# Patient Record
Sex: FEMALE | Race: WHITE | URBAN - METROPOLITAN AREA
[De-identification: names, ages, dates, MRNs, and addresses within clinical notes are randomized per-mention and may not be internally consistent; named-entity substitution may affect disease eponyms.]

---

## 2017-01-01 ENCOUNTER — HOSPITAL ENCOUNTER (OUTPATIENT)
Dept: GENERAL RADIOLOGY | Facility: CLINIC | Age: 82
Discharge: HOME | End: 2017-08-24
Attending: RADIOLOGY | Admitting: RADIOLOGY
Payer: MEDICARE

## 2017-01-01 ENCOUNTER — OFFICE VISIT (OUTPATIENT)
Dept: SURGERY | Facility: CLINIC | Age: 82
End: 2017-01-01
Payer: MEDICARE

## 2017-01-01 ENCOUNTER — TELEPHONE (OUTPATIENT)
Dept: EMERGENCY MEDICINE | Facility: CLINIC | Age: 82
End: 2017-01-01

## 2017-01-01 ENCOUNTER — TELEPHONE (OUTPATIENT)
Dept: SURGERY | Facility: CLINIC | Age: 82
End: 2017-01-01

## 2017-01-01 ENCOUNTER — HOSPITAL ENCOUNTER (OUTPATIENT)
Dept: CT IMAGING | Facility: CLINIC | Age: 82
End: 2017-09-15
Attending: RADIOLOGY
Payer: MEDICARE

## 2017-01-01 ENCOUNTER — APPOINTMENT (OUTPATIENT)
Dept: CT IMAGING | Facility: CLINIC | Age: 82
DRG: 603 | End: 2017-01-01
Attending: EMERGENCY MEDICINE
Payer: MEDICARE

## 2017-01-01 ENCOUNTER — TELEPHONE (OUTPATIENT)
Dept: GENERAL RADIOLOGY | Facility: CLINIC | Age: 82
End: 2017-01-01

## 2017-01-01 ENCOUNTER — TELEPHONE (OUTPATIENT)
Dept: ULTRASOUND IMAGING | Facility: CLINIC | Age: 82
End: 2017-01-01

## 2017-01-01 ENCOUNTER — TRANSFERRED RECORDS (OUTPATIENT)
Dept: HEALTH INFORMATION MANAGEMENT | Facility: CLINIC | Age: 82
End: 2017-01-01

## 2017-01-01 ENCOUNTER — HOSPITAL ENCOUNTER (OUTPATIENT)
Dept: GENERAL RADIOLOGY | Facility: CLINIC | Age: 82
Discharge: HOME | End: 2017-06-01
Attending: RADIOLOGY | Admitting: RADIOLOGY
Payer: MEDICARE

## 2017-01-01 ENCOUNTER — HOSPITAL ENCOUNTER (OUTPATIENT)
Dept: GENERAL RADIOLOGY | Facility: CLINIC | Age: 82
Discharge: HOME | End: 2017-09-15
Attending: RADIOLOGY | Admitting: RADIOLOGY
Payer: MEDICARE

## 2017-01-01 ENCOUNTER — HOSPITAL ENCOUNTER (OUTPATIENT)
Dept: GENERAL RADIOLOGY | Facility: CLINIC | Age: 82
Discharge: HOME | End: 2017-03-20
Attending: RADIOLOGY | Admitting: RADIOLOGY
Payer: MEDICARE

## 2017-01-01 ENCOUNTER — HOSPITAL ENCOUNTER (INPATIENT)
Facility: CLINIC | Age: 82
LOS: 1 days | Discharge: HOME | DRG: 603 | End: 2017-11-18
Attending: EMERGENCY MEDICINE | Admitting: INTERNAL MEDICINE
Payer: MEDICARE

## 2017-01-01 ENCOUNTER — HOSPITAL ENCOUNTER (OUTPATIENT)
Dept: GENERAL RADIOLOGY | Facility: CLINIC | Age: 82
Discharge: HOME | End: 2017-04-12
Attending: RADIOLOGY | Admitting: RADIOLOGY
Payer: MEDICARE

## 2017-01-01 ENCOUNTER — HOSPITAL ENCOUNTER (EMERGENCY)
Facility: CLINIC | Age: 82
Discharge: HOME | End: 2017-08-13
Attending: EMERGENCY MEDICINE | Admitting: EMERGENCY MEDICINE
Payer: MEDICARE

## 2017-01-01 ENCOUNTER — HOSPITAL ENCOUNTER (OUTPATIENT)
Dept: GENERAL RADIOLOGY | Facility: CLINIC | Age: 82
Discharge: HOME | End: 2017-06-30
Attending: RADIOLOGY | Admitting: RADIOLOGY
Payer: MEDICARE

## 2017-01-01 VITALS
OXYGEN SATURATION: 94 % | HEART RATE: 83 BPM | TEMPERATURE: 97.9 F | SYSTOLIC BLOOD PRESSURE: 172 MMHG | RESPIRATION RATE: 18 BRPM | DIASTOLIC BLOOD PRESSURE: 82 MMHG

## 2017-01-01 VITALS
DIASTOLIC BLOOD PRESSURE: 67 MMHG | TEMPERATURE: 97.8 F | SYSTOLIC BLOOD PRESSURE: 158 MMHG | HEIGHT: 57 IN | HEART RATE: 70 BPM | BODY MASS INDEX: 31.59 KG/M2 | WEIGHT: 146.4 LBS | OXYGEN SATURATION: 94 % | RESPIRATION RATE: 24 BRPM

## 2017-01-01 DIAGNOSIS — Z48.00 DRESSING CHANGE: ICD-10-CM

## 2017-01-01 DIAGNOSIS — K80.20 GALLSTONES: ICD-10-CM

## 2017-01-01 DIAGNOSIS — L08.9 SOFT TISSUE INFECTION: ICD-10-CM

## 2017-01-01 DIAGNOSIS — N39.0 URINARY TRACT INFECTION WITHOUT HEMATURIA, SITE UNSPECIFIED: ICD-10-CM

## 2017-01-01 DIAGNOSIS — K81.0 ACUTE CHOLECYSTITIS: Primary | ICD-10-CM

## 2017-01-01 DIAGNOSIS — K82.9 GALL BLADDER DISEASE: ICD-10-CM

## 2017-01-01 DIAGNOSIS — L03.311 CELLULITIS OF ABDOMINAL WALL: ICD-10-CM

## 2017-01-01 LAB
ALBUMIN SERPL-MCNC: 2.9 G/DL (ref 3.4–5)
ALP SERPL-CCNC: 127 U/L (ref 40–150)
ALT SERPL W P-5'-P-CCNC: 21 U/L (ref 0–50)
AMIKACIN SUSC ISLT: <=2 UG/ML
AMPICILLIN SUSC ISLT: 8 UG/ML
AMPICILLIN+SULBAC SUSC ISLT: 4 UG/ML
ANION GAP SERPL CALCULATED.3IONS-SCNC: 5 MMOL/L (ref 3–14)
ANION GAP SERPL CALCULATED.3IONS-SCNC: 6 MMOL/L (ref 3–14)
AST SERPL W P-5'-P-CCNC: 19 U/L (ref 0–45)
B-LACTAMASE EXTENDED SUSC ISLT: NEGATIVE
BACTERIA SPEC CULT: ABNORMAL
BACTERIA SPEC CULT: ABNORMAL
BASOPHILS # BLD AUTO: 0 10E9/L (ref 0–0.2)
BASOPHILS # BLD AUTO: 0.1 10E9/L (ref 0–0.2)
BASOPHILS NFR BLD AUTO: 0.4 %
BASOPHILS NFR BLD AUTO: 0.5 %
BILIRUB SERPL-MCNC: 0.4 MG/DL (ref 0.2–1.3)
BUN SERPL-MCNC: 18 MG/DL (ref 7–30)
BUN SERPL-MCNC: 20 MG/DL (ref 7–30)
CALCIUM SERPL-MCNC: 8.3 MG/DL (ref 8.5–10.1)
CALCIUM SERPL-MCNC: 8.6 MG/DL (ref 8.5–10.1)
CEFAZOLIN SUSC ISLT: <=4 UG/ML
CEFEPIME SUSC ISLT: <=1 UG/ML
CEFTAZIDIME SUSC ISLT: <=1 UG/ML
CEFTRIAXONE SUSC ISLT: <=1 UG/ML
CHLORIDE SERPL-SCNC: 101 MMOL/L (ref 94–109)
CHLORIDE SERPL-SCNC: 102 MMOL/L (ref 94–109)
CIPROFLOXACIN SUSC ISLT: <=0.25 UG/ML
CO2 SERPL-SCNC: 30 MMOL/L (ref 20–32)
CO2 SERPL-SCNC: 31 MMOL/L (ref 20–32)
CREAT BLD-MCNC: 0.7 MG/DL (ref 0.52–1.04)
CREAT SERPL-MCNC: 0.75 MG/DL (ref 0.52–1.04)
CREAT SERPL-MCNC: 0.81 MG/DL (ref 0.52–1.04)
DIFFERENTIAL METHOD BLD: ABNORMAL
DIFFERENTIAL METHOD BLD: ABNORMAL
EOSINOPHIL # BLD AUTO: 0.2 10E9/L (ref 0–0.7)
EOSINOPHIL # BLD AUTO: 0.2 10E9/L (ref 0–0.7)
EOSINOPHIL NFR BLD AUTO: 1.4 %
EOSINOPHIL NFR BLD AUTO: 2.7 %
ERYTHROCYTE [DISTWIDTH] IN BLOOD BY AUTOMATED COUNT: 17.7 % (ref 10–15)
ERYTHROCYTE [DISTWIDTH] IN BLOOD BY AUTOMATED COUNT: 18 % (ref 10–15)
GENTAMICIN SUSC ISLT: <=1 UG/ML
GFR SERPL CREATININE-BSD FRML MDRD: 66 ML/MIN/1.7M2
GFR SERPL CREATININE-BSD FRML MDRD: 72 ML/MIN/1.7M2
GFR SERPL CREATININE-BSD FRML MDRD: 78 ML/MIN/1.7M2
GLUCOSE SERPL-MCNC: 117 MG/DL (ref 70–99)
GLUCOSE SERPL-MCNC: 119 MG/DL (ref 70–99)
HCT VFR BLD AUTO: 34 % (ref 35–47)
HCT VFR BLD AUTO: 35.3 % (ref 35–47)
HGB BLD-MCNC: 10.7 G/DL (ref 11.7–15.7)
HGB BLD-MCNC: 10.9 G/DL (ref 11.7–15.7)
IMM GRANULOCYTES # BLD: 0 10E9/L (ref 0–0.4)
IMM GRANULOCYTES # BLD: 0 10E9/L (ref 0–0.4)
IMM GRANULOCYTES NFR BLD: 0.4 %
IMM GRANULOCYTES NFR BLD: 0.4 %
LEVOFLOXACIN SUSC ISLT: <=0.12 UG/ML
LIPASE SERPL-CCNC: 43 U/L (ref 73–393)
LYMPHOCYTES # BLD AUTO: 1.7 10E9/L (ref 0.8–5.3)
LYMPHOCYTES # BLD AUTO: 2.3 10E9/L (ref 0.8–5.3)
LYMPHOCYTES NFR BLD AUTO: 21.2 %
LYMPHOCYTES NFR BLD AUTO: 22.6 %
MCH RBC QN AUTO: 25.2 PG (ref 26.5–33)
MCH RBC QN AUTO: 25.7 PG (ref 26.5–33)
MCHC RBC AUTO-ENTMCNC: 30.9 G/DL (ref 31.5–36.5)
MCHC RBC AUTO-ENTMCNC: 31.5 G/DL (ref 31.5–36.5)
MCV RBC AUTO: 82 FL (ref 78–100)
MCV RBC AUTO: 82 FL (ref 78–100)
MEROPENEM SUSC ISLT: <=0.25 UG/ML
MONOCYTES # BLD AUTO: 0.9 10E9/L (ref 0–1.3)
MONOCYTES # BLD AUTO: 1.3 10E9/L (ref 0–1.3)
MONOCYTES NFR BLD AUTO: 12 %
MONOCYTES NFR BLD AUTO: 12.3 %
NEUTROPHILS # BLD AUTO: 4.6 10E9/L (ref 1.6–8.3)
NEUTROPHILS # BLD AUTO: 7 10E9/L (ref 1.6–8.3)
NEUTROPHILS NFR BLD AUTO: 61.6 %
NEUTROPHILS NFR BLD AUTO: 64.5 %
NRBC # BLD AUTO: 0 10*3/UL
NRBC # BLD AUTO: 0 10*3/UL
NRBC BLD AUTO-RTO: 0 /100
NRBC BLD AUTO-RTO: 0 /100
PIP+TAZO SUSC ISLT: <=4 UG/ML
PLATELET # BLD AUTO: 280 10E9/L (ref 150–450)
PLATELET # BLD AUTO: 297 10E9/L (ref 150–450)
POTASSIUM SERPL-SCNC: 3.9 MMOL/L (ref 3.4–5.3)
POTASSIUM SERPL-SCNC: 4.1 MMOL/L (ref 3.4–5.3)
PROT SERPL-MCNC: 7.5 G/DL (ref 6.8–8.8)
RBC # BLD AUTO: 4.17 10E12/L (ref 3.8–5.2)
RBC # BLD AUTO: 4.32 10E12/L (ref 3.8–5.2)
SODIUM SERPL-SCNC: 137 MMOL/L (ref 133–144)
SODIUM SERPL-SCNC: 138 MMOL/L (ref 133–144)
SPECIMEN SOURCE: ABNORMAL
TMP SMX SUSC ISLT: ABNORMAL UG/ML
TOBRAMYCIN SUSC ISLT: <=1 UG/ML
WBC # BLD AUTO: 10.9 10E9/L (ref 4–11)
WBC # BLD AUTO: 7.5 10E9/L (ref 4–11)

## 2017-01-01 PROCEDURE — 40000809 ZZH STATISTIC NO DOCUMENTATION TO SUPPORT CHARGE

## 2017-01-01 PROCEDURE — 25000128 H RX IP 250 OP 636: Performed by: EMERGENCY MEDICINE

## 2017-01-01 PROCEDURE — 87070 CULTURE OTHR SPECIMN AEROBIC: CPT | Performed by: EMERGENCY MEDICINE

## 2017-01-01 PROCEDURE — 99222 1ST HOSP IP/OBS MODERATE 55: CPT | Mod: AI | Performed by: INTERNAL MEDICINE

## 2017-01-01 PROCEDURE — 25000132 ZZH RX MED GY IP 250 OP 250 PS 637: Mod: GY | Performed by: INTERNAL MEDICINE

## 2017-01-01 PROCEDURE — 74177 CT ABD & PELVIS W/CONTRAST: CPT

## 2017-01-01 PROCEDURE — 85025 COMPLETE CBC W/AUTO DIFF WBC: CPT | Performed by: INTERNAL MEDICINE

## 2017-01-01 PROCEDURE — 99207 ZZC CDG-MDM COMPONENT: MEETS LOW - DOWN CODED: CPT | Performed by: INTERNAL MEDICINE

## 2017-01-01 PROCEDURE — 25000128 H RX IP 250 OP 636: Performed by: INTERNAL MEDICINE

## 2017-01-01 PROCEDURE — 36415 COLL VENOUS BLD VENIPUNCTURE: CPT | Performed by: INTERNAL MEDICINE

## 2017-01-01 PROCEDURE — 82565 ASSAY OF CREATININE: CPT

## 2017-01-01 PROCEDURE — 99283 EMERGENCY DEPT VISIT LOW MDM: CPT

## 2017-01-01 PROCEDURE — 74150 CT ABDOMEN W/O CONTRAST: CPT

## 2017-01-01 PROCEDURE — 87077 CULTURE AEROBIC IDENTIFY: CPT | Performed by: EMERGENCY MEDICINE

## 2017-01-01 PROCEDURE — 99212 OFFICE O/P EST SF 10 MIN: CPT | Performed by: SURGERY

## 2017-01-01 PROCEDURE — 87186 SC STD MICRODIL/AGAR DIL: CPT | Performed by: EMERGENCY MEDICINE

## 2017-01-01 PROCEDURE — 99239 HOSP IP/OBS DSCHRG MGMT >30: CPT | Performed by: HOSPITALIST

## 2017-01-01 PROCEDURE — 85025 COMPLETE CBC W/AUTO DIFF WBC: CPT | Performed by: EMERGENCY MEDICINE

## 2017-01-01 PROCEDURE — 83690 ASSAY OF LIPASE: CPT | Performed by: EMERGENCY MEDICINE

## 2017-01-01 PROCEDURE — 12000000 ZZH R&B MED SURG/OB

## 2017-01-01 PROCEDURE — A9270 NON-COVERED ITEM OR SERVICE: HCPCS | Mod: GY | Performed by: INTERNAL MEDICINE

## 2017-01-01 PROCEDURE — 80053 COMPREHEN METABOLIC PANEL: CPT | Performed by: EMERGENCY MEDICINE

## 2017-01-01 PROCEDURE — 25000128 H RX IP 250 OP 636: Performed by: RADIOLOGY

## 2017-01-01 PROCEDURE — 47534 PLMT BILIARY DRAINAGE CATH: CPT

## 2017-01-01 PROCEDURE — 99285 EMERGENCY DEPT VISIT HI MDM: CPT | Mod: 25

## 2017-01-01 PROCEDURE — 25000125 ZZHC RX 250

## 2017-01-01 PROCEDURE — 96365 THER/PROPH/DIAG IV INF INIT: CPT

## 2017-01-01 PROCEDURE — 99219 ZZC INITIAL OBSERVATION CARE,LEVL II: CPT | Performed by: SURGERY

## 2017-01-01 PROCEDURE — 80048 BASIC METABOLIC PNL TOTAL CA: CPT | Performed by: INTERNAL MEDICINE

## 2017-01-01 RX ORDER — POTASSIUM CHLORIDE 1.5 G/1.58G
20-40 POWDER, FOR SOLUTION ORAL
Status: DISCONTINUED | OUTPATIENT
Start: 2017-01-01 | End: 2017-01-01 | Stop reason: HOSPADM

## 2017-01-01 RX ORDER — AMOXICILLIN 250 MG
1 CAPSULE ORAL 2 TIMES DAILY PRN
Status: DISCONTINUED | OUTPATIENT
Start: 2017-01-01 | End: 2017-01-01 | Stop reason: HOSPADM

## 2017-01-01 RX ORDER — ONDANSETRON 2 MG/ML
4 INJECTION INTRAMUSCULAR; INTRAVENOUS EVERY 6 HOURS PRN
Status: DISCONTINUED | OUTPATIENT
Start: 2017-01-01 | End: 2017-01-01 | Stop reason: HOSPADM

## 2017-01-01 RX ORDER — CHOLECALCIFEROL (VITAMIN D3) 50 MCG
1 CAPSULE ORAL 4 TIMES DAILY
Qty: 40 TABLET | Refills: 0 | Status: SHIPPED | OUTPATIENT
Start: 2017-01-01 | End: 2017-01-01

## 2017-01-01 RX ORDER — ASPIRIN 81 MG/1
81 TABLET ORAL DAILY
Status: DISCONTINUED | OUTPATIENT
Start: 2017-01-01 | End: 2017-01-01 | Stop reason: HOSPADM

## 2017-01-01 RX ORDER — MULTIPLE VITAMINS W/ MINERALS TAB 9MG-400MCG
1 TAB ORAL DAILY
Status: DISCONTINUED | OUTPATIENT
Start: 2017-01-01 | End: 2017-01-01 | Stop reason: HOSPADM

## 2017-01-01 RX ORDER — POTASSIUM CHLORIDE 29.8 MG/ML
20 INJECTION INTRAVENOUS
Status: DISCONTINUED | OUTPATIENT
Start: 2017-01-01 | End: 2017-01-01 | Stop reason: HOSPADM

## 2017-01-01 RX ORDER — ATORVASTATIN CALCIUM 40 MG/1
40 TABLET, FILM COATED ORAL EVERY EVENING
Status: DISCONTINUED | OUTPATIENT
Start: 2017-01-01 | End: 2017-01-01 | Stop reason: HOSPADM

## 2017-01-01 RX ORDER — METOPROLOL TARTRATE 25 MG/1
25 TABLET, FILM COATED ORAL 2 TIMES DAILY
Status: DISCONTINUED | OUTPATIENT
Start: 2017-01-01 | End: 2017-01-01 | Stop reason: HOSPADM

## 2017-01-01 RX ORDER — BISMUTH SUBSALICYLATE 262 MG/1
524 TABLET, CHEWABLE ORAL DAILY PRN
COMMUNITY

## 2017-01-01 RX ORDER — PANTOPRAZOLE SODIUM 20 MG/1
20 TABLET, DELAYED RELEASE ORAL EVERY EVENING
Status: DISCONTINUED | OUTPATIENT
Start: 2017-01-01 | End: 2017-01-01 | Stop reason: HOSPADM

## 2017-01-01 RX ORDER — AMOXICILLIN AND CLAVULANATE POTASSIUM 500; 125 MG/1; MG/1
1 TABLET, FILM COATED ORAL 2 TIMES DAILY
Qty: 14 TABLET | Refills: 0 | Status: SHIPPED | OUTPATIENT
Start: 2017-01-01 | End: 2017-01-01

## 2017-01-01 RX ORDER — IOPAMIDOL 755 MG/ML
500 INJECTION, SOLUTION INTRAVASCULAR ONCE
Status: COMPLETED | OUTPATIENT
Start: 2017-01-01 | End: 2017-01-01

## 2017-01-01 RX ORDER — POTASSIUM CHLORIDE 7.45 MG/ML
10 INJECTION INTRAVENOUS
Status: DISCONTINUED | OUTPATIENT
Start: 2017-01-01 | End: 2017-01-01 | Stop reason: HOSPADM

## 2017-01-01 RX ORDER — ISOSORBIDE MONONITRATE 30 MG/1
30 TABLET, EXTENDED RELEASE ORAL DAILY
Status: DISCONTINUED | OUTPATIENT
Start: 2017-01-01 | End: 2017-01-01

## 2017-01-01 RX ORDER — AMOXICILLIN 250 MG
2 CAPSULE ORAL 2 TIMES DAILY PRN
Status: DISCONTINUED | OUTPATIENT
Start: 2017-01-01 | End: 2017-01-01 | Stop reason: HOSPADM

## 2017-01-01 RX ORDER — IOPAMIDOL 612 MG/ML
50 INJECTION, SOLUTION INTRAVASCULAR ONCE
Status: COMPLETED | OUTPATIENT
Start: 2017-01-01 | End: 2017-01-01

## 2017-01-01 RX ORDER — CEPHALEXIN 500 MG/1
500 CAPSULE ORAL 3 TIMES DAILY
Qty: 21 CAPSULE | Refills: 0 | Status: SHIPPED | OUTPATIENT
Start: 2017-01-01 | End: 2017-01-01

## 2017-01-01 RX ORDER — GINSENG 100 MG
CAPSULE ORAL
Status: COMPLETED
Start: 2017-01-01 | End: 2017-01-01

## 2017-01-01 RX ORDER — POTASSIUM CL/LIDO/0.9 % NACL 10MEQ/0.1L
10 INTRAVENOUS SOLUTION, PIGGYBACK (ML) INTRAVENOUS
Status: DISCONTINUED | OUTPATIENT
Start: 2017-01-01 | End: 2017-01-01 | Stop reason: HOSPADM

## 2017-01-01 RX ORDER — ONDANSETRON 4 MG/1
4 TABLET, ORALLY DISINTEGRATING ORAL EVERY 6 HOURS PRN
Status: DISCONTINUED | OUTPATIENT
Start: 2017-01-01 | End: 2017-01-01 | Stop reason: HOSPADM

## 2017-01-01 RX ORDER — LEVOTHYROXINE SODIUM 75 UG/1
75 TABLET ORAL DAILY
Status: DISCONTINUED | OUTPATIENT
Start: 2017-01-01 | End: 2017-01-01 | Stop reason: HOSPADM

## 2017-01-01 RX ORDER — ISOSORBIDE MONONITRATE 30 MG/1
60 TABLET, EXTENDED RELEASE ORAL DAILY
Status: DISCONTINUED | OUTPATIENT
Start: 2017-01-01 | End: 2017-01-01

## 2017-01-01 RX ORDER — GINSENG 100 MG
CAPSULE ORAL
Status: DISCONTINUED
Start: 2017-01-01 | End: 2017-01-01 | Stop reason: HOSPADM

## 2017-01-01 RX ORDER — ISOSORBIDE MONONITRATE 30 MG/1
30 TABLET, EXTENDED RELEASE ORAL DAILY
COMMUNITY

## 2017-01-01 RX ORDER — ISOSORBIDE MONONITRATE 30 MG/1
30 TABLET, EXTENDED RELEASE ORAL DAILY
Status: DISCONTINUED | OUTPATIENT
Start: 2017-01-01 | End: 2017-01-01 | Stop reason: HOSPADM

## 2017-01-01 RX ORDER — POLYETHYLENE GLYCOL 3350 17 G/17G
17 POWDER, FOR SOLUTION ORAL DAILY PRN
Status: DISCONTINUED | OUTPATIENT
Start: 2017-01-01 | End: 2017-01-01 | Stop reason: HOSPADM

## 2017-01-01 RX ORDER — POTASSIUM CHLORIDE 1500 MG/1
20-40 TABLET, EXTENDED RELEASE ORAL
Status: DISCONTINUED | OUTPATIENT
Start: 2017-01-01 | End: 2017-01-01 | Stop reason: HOSPADM

## 2017-01-01 RX ORDER — GINSENG 100 MG
CAPSULE ORAL ONCE
Status: COMPLETED | OUTPATIENT
Start: 2017-01-01 | End: 2017-01-01

## 2017-01-01 RX ORDER — ACETAMINOPHEN 325 MG/1
650 TABLET ORAL EVERY 4 HOURS PRN
Status: DISCONTINUED | OUTPATIENT
Start: 2017-01-01 | End: 2017-01-01 | Stop reason: HOSPADM

## 2017-01-01 RX ORDER — NALOXONE HYDROCHLORIDE 0.4 MG/ML
.1-.4 INJECTION, SOLUTION INTRAMUSCULAR; INTRAVENOUS; SUBCUTANEOUS
Status: DISCONTINUED | OUTPATIENT
Start: 2017-01-01 | End: 2017-01-01 | Stop reason: HOSPADM

## 2017-01-01 RX ORDER — BISMUTH SUBSALICYLATE 262 MG/1
524 TABLET, CHEWABLE ORAL DAILY PRN
Status: DISCONTINUED | OUTPATIENT
Start: 2017-01-01 | End: 2017-01-01 | Stop reason: HOSPADM

## 2017-01-01 RX ADMIN — IOPAMIDOL 25 ML: 612 INJECTION, SOLUTION INTRAVENOUS at 14:39

## 2017-01-01 RX ADMIN — SODIUM CHLORIDE 58 ML: 9 INJECTION, SOLUTION INTRAVENOUS at 19:47

## 2017-01-01 RX ADMIN — PANTOPRAZOLE SODIUM 20 MG: 20 TABLET, DELAYED RELEASE ORAL at 22:55

## 2017-01-01 RX ADMIN — TAZOBACTAM SODIUM AND PIPERACILLIN SODIUM 3.38 G: 375; 3 INJECTION, SOLUTION INTRAVENOUS at 02:47

## 2017-01-01 RX ADMIN — TAZOBACTAM SODIUM AND PIPERACILLIN SODIUM 3.38 G: 375; 3 INJECTION, SOLUTION INTRAVENOUS at 21:21

## 2017-01-01 RX ADMIN — TAZOBACTAM SODIUM AND PIPERACILLIN SODIUM 3.38 G: 375; 3 INJECTION, SOLUTION INTRAVENOUS at 08:46

## 2017-01-01 RX ADMIN — MULTIPLE VITAMINS W/ MINERALS TAB 1 TABLET: TAB at 08:47

## 2017-01-01 RX ADMIN — METOPROLOL TARTRATE 25 MG: 25 TABLET ORAL at 08:47

## 2017-01-01 RX ADMIN — Medication 2.5 MG: at 23:44

## 2017-01-01 RX ADMIN — IOPAMIDOL 72 ML: 755 INJECTION, SOLUTION INTRAVENOUS at 19:47

## 2017-01-01 RX ADMIN — Medication 5 PACKAGE: at 14:17

## 2017-01-01 RX ADMIN — METOPROLOL TARTRATE 25 MG: 25 TABLET ORAL at 22:55

## 2017-01-01 RX ADMIN — BACITRACIN 5 PACKAGE: 500 OINTMENT TOPICAL at 14:17

## 2017-01-01 RX ADMIN — OYSTER SHELL CALCIUM WITH VITAMIN D 1 TABLET: 500; 200 TABLET, FILM COATED ORAL at 08:47

## 2017-01-01 RX ADMIN — OYSTER SHELL CALCIUM WITH VITAMIN D 1 TABLET: 500; 200 TABLET, FILM COATED ORAL at 22:55

## 2017-01-01 RX ADMIN — LEVOTHYROXINE SODIUM 75 MCG: 75 TABLET ORAL at 06:17

## 2017-01-01 RX ADMIN — ISOSORBIDE MONONITRATE 30 MG: 30 TABLET, EXTENDED RELEASE ORAL at 08:47

## 2017-01-01 RX ADMIN — ATORVASTATIN CALCIUM 40 MG: 40 TABLET, FILM COATED ORAL at 22:55

## 2017-01-01 RX ADMIN — ASPIRIN 81 MG: 81 TABLET, COATED ORAL at 08:47

## 2017-01-01 ASSESSMENT — ENCOUNTER SYMPTOMS
SHORTNESS OF BREATH: 0
COUGH: 0
LIGHT-HEADEDNESS: 0
BACK PAIN: 0
ANAL BLEEDING: 0
FEVER: 0
DIFFICULTY URINATING: 0
ABDOMINAL PAIN: 1
WEAKNESS: 0
VOMITING: 0
HEADACHES: 0
NUMBNESS: 0
FATIGUE: 0
WOUND: 0
FEVER: 0
DIARRHEA: 0
CHILLS: 0
NAUSEA: 0
BLOOD IN STOOL: 0
SHORTNESS OF BREATH: 0
DIZZINESS: 0
COLOR CHANGE: 1
DYSURIA: 0
CHILLS: 0
VOMITING: 0
HEMATURIA: 0
APPETITE CHANGE: 0

## 2017-01-01 ASSESSMENT — ACTIVITIES OF DAILY LIVING (ADL)
ADLS_ACUITY_SCORE: 12
ADLS_ACUITY_SCORE: 17
ADLS_ACUITY_SCORE: 13
ADLS_ACUITY_SCORE: 17

## 2017-01-24 ENCOUNTER — HOSPITAL ENCOUNTER (INPATIENT)
Facility: CLINIC | Age: 82
LOS: 3 days | Discharge: HOME HEALTH | DRG: 872 | End: 2017-01-27
Attending: EMERGENCY MEDICINE | Admitting: INTERNAL MEDICINE
Payer: MEDICARE

## 2017-01-24 ENCOUNTER — APPOINTMENT (OUTPATIENT)
Dept: GENERAL RADIOLOGY | Facility: CLINIC | Age: 82
DRG: 872 | End: 2017-01-24
Attending: EMERGENCY MEDICINE
Payer: MEDICARE

## 2017-01-24 ENCOUNTER — APPOINTMENT (OUTPATIENT)
Dept: CT IMAGING | Facility: CLINIC | Age: 82
DRG: 872 | End: 2017-01-24
Attending: EMERGENCY MEDICINE
Payer: MEDICARE

## 2017-01-24 ENCOUNTER — APPOINTMENT (OUTPATIENT)
Dept: ULTRASOUND IMAGING | Facility: CLINIC | Age: 82
DRG: 872 | End: 2017-01-24
Attending: EMERGENCY MEDICINE
Payer: MEDICARE

## 2017-01-24 DIAGNOSIS — M62.81 GENERALIZED MUSCLE WEAKNESS: ICD-10-CM

## 2017-01-24 DIAGNOSIS — R50.9 FEVER, UNSPECIFIED: ICD-10-CM

## 2017-01-24 DIAGNOSIS — K81.0 ACUTE CHOLECYSTITIS: Primary | ICD-10-CM

## 2017-01-24 LAB
ALBUMIN SERPL-MCNC: 2.5 G/DL (ref 3.4–5)
ALBUMIN UR-MCNC: 10 MG/DL
ALP SERPL-CCNC: 561 U/L (ref 40–150)
ALT SERPL W P-5'-P-CCNC: 360 U/L (ref 0–50)
ANION GAP SERPL CALCULATED.3IONS-SCNC: 11 MMOL/L (ref 3–14)
APPEARANCE UR: CLEAR
AST SERPL W P-5'-P-CCNC: 556 U/L (ref 0–45)
BASOPHILS # BLD AUTO: 0 10E9/L (ref 0–0.2)
BASOPHILS NFR BLD AUTO: 0.1 %
BILIRUB DIRECT SERPL-MCNC: 1 MG/DL (ref 0–0.2)
BILIRUB SERPL-MCNC: 2 MG/DL (ref 0.2–1.3)
BILIRUB UR QL STRIP: NEGATIVE
BUN SERPL-MCNC: 22 MG/DL (ref 7–30)
CALCIUM SERPL-MCNC: 8.9 MG/DL (ref 8.5–10.1)
CHLORIDE SERPL-SCNC: 98 MMOL/L (ref 94–109)
CO2 SERPL-SCNC: 26 MMOL/L (ref 20–32)
COLOR UR AUTO: YELLOW
CREAT SERPL-MCNC: 0.63 MG/DL (ref 0.52–1.04)
DEPRECATED S PYO AG THROAT QL EIA: NORMAL
DIFFERENTIAL METHOD BLD: ABNORMAL
EOSINOPHIL # BLD AUTO: 0 10E9/L (ref 0–0.7)
EOSINOPHIL NFR BLD AUTO: 0 %
ERYTHROCYTE [DISTWIDTH] IN BLOOD BY AUTOMATED COUNT: 17.5 % (ref 10–15)
FLUAV+FLUBV AG SPEC QL: NEGATIVE
FLUAV+FLUBV AG SPEC QL: NORMAL
GFR SERPL CREATININE-BSD FRML MDRD: 88 ML/MIN/1.7M2
GLUCOSE SERPL-MCNC: 207 MG/DL (ref 70–99)
GLUCOSE UR STRIP-MCNC: NEGATIVE MG/DL
HCT VFR BLD AUTO: 32.3 % (ref 35–47)
HGB BLD-MCNC: 10.2 G/DL (ref 11.7–15.7)
HGB UR QL STRIP: NEGATIVE
IMM GRANULOCYTES # BLD: 0.2 10E9/L (ref 0–0.4)
IMM GRANULOCYTES NFR BLD: 1 %
INR PPP: 1.16 (ref 0.86–1.14)
KETONES UR STRIP-MCNC: NEGATIVE MG/DL
LACTATE BLD-SCNC: 2.3 MMOL/L (ref 0.7–2.1)
LACTATE BLD-SCNC: 3.1 MMOL/L (ref 0.7–2.1)
LEUKOCYTE ESTERASE UR QL STRIP: NEGATIVE
LIPASE SERPL-CCNC: 34 U/L (ref 73–393)
LYMPHOCYTES # BLD AUTO: 0.5 10E9/L (ref 0.8–5.3)
LYMPHOCYTES NFR BLD AUTO: 3.7 %
MCH RBC QN AUTO: 25.1 PG (ref 26.5–33)
MCHC RBC AUTO-ENTMCNC: 31.6 G/DL (ref 31.5–36.5)
MCV RBC AUTO: 79 FL (ref 78–100)
MICRO REPORT STATUS: NORMAL
MONOCYTES # BLD AUTO: 1.3 10E9/L (ref 0–1.3)
MONOCYTES NFR BLD AUTO: 9.2 %
MUCOUS THREADS #/AREA URNS LPF: PRESENT /LPF
NEUTROPHILS # BLD AUTO: 12.5 10E9/L (ref 1.6–8.3)
NEUTROPHILS NFR BLD AUTO: 86 %
NITRATE UR QL: NEGATIVE
NRBC # BLD AUTO: 0 10*3/UL
NRBC BLD AUTO-RTO: 0 /100
PH UR STRIP: 5 PH (ref 5–7)
PLATELET # BLD AUTO: 324 10E9/L (ref 150–450)
POTASSIUM SERPL-SCNC: 4.2 MMOL/L (ref 3.4–5.3)
PROT SERPL-MCNC: 7.8 G/DL (ref 6.8–8.8)
RBC # BLD AUTO: 4.07 10E12/L (ref 3.8–5.2)
RBC #/AREA URNS AUTO: 0 /HPF (ref 0–2)
SODIUM SERPL-SCNC: 135 MMOL/L (ref 133–144)
SP GR UR STRIP: 1.01 (ref 1–1.03)
SPECIMEN SOURCE: NORMAL
SPECIMEN SOURCE: NORMAL
SQUAMOUS #/AREA URNS AUTO: <1 /HPF (ref 0–1)
TROPONIN I SERPL-MCNC: 0.03 UG/L (ref 0–0.04)
URN SPEC COLLECT METH UR: ABNORMAL
UROBILINOGEN UR STRIP-MCNC: 2 MG/DL (ref 0–2)
WBC # BLD AUTO: 14.6 10E9/L (ref 4–11)
WBC #/AREA URNS AUTO: 0 /HPF (ref 0–2)

## 2017-01-24 PROCEDURE — 36415 COLL VENOUS BLD VENIPUNCTURE: CPT

## 2017-01-24 PROCEDURE — 71020 XR CHEST 2 VW: CPT

## 2017-01-24 PROCEDURE — 93005 ELECTROCARDIOGRAM TRACING: CPT

## 2017-01-24 PROCEDURE — 25800025 ZZH RX 258: Performed by: EMERGENCY MEDICINE

## 2017-01-24 PROCEDURE — 25000128 H RX IP 250 OP 636: Performed by: EMERGENCY MEDICINE

## 2017-01-24 PROCEDURE — 25000132 ZZH RX MED GY IP 250 OP 250 PS 637: Performed by: EMERGENCY MEDICINE

## 2017-01-24 PROCEDURE — 12000000 ZZH R&B MED SURG/OB

## 2017-01-24 PROCEDURE — 81001 URINALYSIS AUTO W/SCOPE: CPT | Performed by: EMERGENCY MEDICINE

## 2017-01-24 PROCEDURE — 96361 HYDRATE IV INFUSION ADD-ON: CPT

## 2017-01-24 PROCEDURE — 87077 CULTURE AEROBIC IDENTIFY: CPT | Performed by: EMERGENCY MEDICINE

## 2017-01-24 PROCEDURE — 36415 COLL VENOUS BLD VENIPUNCTURE: CPT | Performed by: EMERGENCY MEDICINE

## 2017-01-24 PROCEDURE — 25500064 ZZH RX 255 OP 636: Performed by: EMERGENCY MEDICINE

## 2017-01-24 PROCEDURE — 70450 CT HEAD/BRAIN W/O DYE: CPT

## 2017-01-24 PROCEDURE — 76705 ECHO EXAM OF ABDOMEN: CPT

## 2017-01-24 PROCEDURE — 80076 HEPATIC FUNCTION PANEL: CPT | Performed by: EMERGENCY MEDICINE

## 2017-01-24 PROCEDURE — 83605 ASSAY OF LACTIC ACID: CPT | Performed by: EMERGENCY MEDICINE

## 2017-01-24 PROCEDURE — 87040 BLOOD CULTURE FOR BACTERIA: CPT | Performed by: EMERGENCY MEDICINE

## 2017-01-24 PROCEDURE — 87186 SC STD MICRODIL/AGAR DIL: CPT | Performed by: EMERGENCY MEDICINE

## 2017-01-24 PROCEDURE — 99285 EMERGENCY DEPT VISIT HI MDM: CPT | Mod: 25

## 2017-01-24 PROCEDURE — 85025 COMPLETE CBC W/AUTO DIFF WBC: CPT | Performed by: EMERGENCY MEDICINE

## 2017-01-24 PROCEDURE — 80048 BASIC METABOLIC PNL TOTAL CA: CPT | Performed by: EMERGENCY MEDICINE

## 2017-01-24 PROCEDURE — 74177 CT ABD & PELVIS W/CONTRAST: CPT

## 2017-01-24 PROCEDURE — 85610 PROTHROMBIN TIME: CPT | Performed by: EMERGENCY MEDICINE

## 2017-01-24 PROCEDURE — 87081 CULTURE SCREEN ONLY: CPT | Performed by: EMERGENCY MEDICINE

## 2017-01-24 PROCEDURE — 96374 THER/PROPH/DIAG INJ IV PUSH: CPT

## 2017-01-24 PROCEDURE — 87880 STREP A ASSAY W/OPTIC: CPT | Performed by: EMERGENCY MEDICINE

## 2017-01-24 PROCEDURE — 83690 ASSAY OF LIPASE: CPT | Performed by: PHYSICIAN ASSISTANT

## 2017-01-24 PROCEDURE — 87800 DETECT AGNT MULT DNA DIREC: CPT | Performed by: EMERGENCY MEDICINE

## 2017-01-24 PROCEDURE — 84484 ASSAY OF TROPONIN QUANT: CPT | Performed by: EMERGENCY MEDICINE

## 2017-01-24 PROCEDURE — 99223 1ST HOSP IP/OBS HIGH 75: CPT | Mod: AI | Performed by: INTERNAL MEDICINE

## 2017-01-24 PROCEDURE — 87804 INFLUENZA ASSAY W/OPTIC: CPT | Performed by: EMERGENCY MEDICINE

## 2017-01-24 RX ORDER — PANTOPRAZOLE SODIUM 20 MG/1
20 TABLET, DELAYED RELEASE ORAL DAILY
Status: CANCELLED | OUTPATIENT
Start: 2017-01-25

## 2017-01-24 RX ORDER — CLOPIDOGREL BISULFATE 75 MG/1
75 TABLET ORAL DAILY
COMMUNITY
End: 2017-01-01

## 2017-01-24 RX ORDER — ATORVASTATIN CALCIUM 40 MG/1
40 TABLET, FILM COATED ORAL EVERY EVENING
COMMUNITY

## 2017-01-24 RX ORDER — PANTOPRAZOLE SODIUM 20 MG/1
20 TABLET, DELAYED RELEASE ORAL DAILY
COMMUNITY

## 2017-01-24 RX ORDER — SODIUM CHLORIDE, SODIUM LACTATE, POTASSIUM CHLORIDE, CALCIUM CHLORIDE 600; 310; 30; 20 MG/100ML; MG/100ML; MG/100ML; MG/100ML
1000 INJECTION, SOLUTION INTRAVENOUS CONTINUOUS
Status: DISCONTINUED | OUTPATIENT
Start: 2017-01-24 | End: 2017-01-25

## 2017-01-24 RX ORDER — IOPAMIDOL 755 MG/ML
500 INJECTION, SOLUTION INTRAVASCULAR ONCE
Status: COMPLETED | OUTPATIENT
Start: 2017-01-24 | End: 2017-01-24

## 2017-01-24 RX ORDER — METOPROLOL TARTRATE 25 MG/1
25 TABLET, FILM COATED ORAL 2 TIMES DAILY
COMMUNITY

## 2017-01-24 RX ORDER — LEVOTHYROXINE SODIUM 75 UG/1
75 TABLET ORAL DAILY
COMMUNITY

## 2017-01-24 RX ORDER — ACETAMINOPHEN 325 MG/1
650 TABLET ORAL ONCE
Status: COMPLETED | OUTPATIENT
Start: 2017-01-24 | End: 2017-01-24

## 2017-01-24 RX ORDER — ISOSORBIDE MONONITRATE 60 MG/1
60 TABLET, EXTENDED RELEASE ORAL DAILY
COMMUNITY
End: 2017-01-01

## 2017-01-24 RX ORDER — MULTIVIT WITH MINERALS/LUTEIN
1 TABLET ORAL DAILY
COMMUNITY

## 2017-01-24 RX ADMIN — SODIUM CHLORIDE 500 ML: 9 INJECTION, SOLUTION INTRAVENOUS at 18:37

## 2017-01-24 RX ADMIN — SODIUM CHLORIDE, POTASSIUM CHLORIDE, SODIUM LACTATE AND CALCIUM CHLORIDE 1000 ML: 600; 310; 30; 20 INJECTION, SOLUTION INTRAVENOUS at 20:08

## 2017-01-24 RX ADMIN — SODIUM CHLORIDE 59 ML: 9 INJECTION, SOLUTION INTRAVENOUS at 23:46

## 2017-01-24 RX ADMIN — IOPAMIDOL 71 ML: 755 INJECTION, SOLUTION INTRAVENOUS at 23:46

## 2017-01-24 RX ADMIN — ACETAMINOPHEN 650 MG: 325 TABLET, FILM COATED ORAL at 20:08

## 2017-01-24 ASSESSMENT — ENCOUNTER SYMPTOMS
TREMORS: 1
CONFUSION: 1
NECK STIFFNESS: 0
SORE THROAT: 0
COUGH: 0
FEVER: 0
HEADACHES: 0
WEAKNESS: 1
FATIGUE: 1
NECK PAIN: 0

## 2017-01-24 NOTE — IP AVS SNAPSHOT
MRN:[id number]                      After Visit Summary   1/24/2017    Pamela Garcia    MRN: [id number]           Thank you!     Thank you for choosing [facility] for your care. Our goal is always to provide you with excellent care. Hearing back from our patients is one way we can continue to improve our services. Please take a few minutes to complete the written survey that you may receive in the mail after you visit. If you would like to speak to someone directly about your visit please contact Patient Relations at [phone]. Thank you!          Patient Information     Date Of Birth          12/18/1925        About your hospital stay     You were admitted on:  January 24, 2017 You last received care in the:  [place] [facility] [address] [facility]    You were discharged on:  January 27, 2017       Who to Call     For medical emergencies, please call 720.  For non-urgent questions about your medical care, please call your primary care provider or clinic, [phone]          Attending Provider     Provider    Gilma Julien MD Foehrenbacher, Balaji De La Torre MD       Primary Care Provider Office Phone # Fax #    Lemuel Riggs [phone]       [facility] [address]   [facility] [id number]        After Care Instructions     Activity       Your activity upon discharge: activity as tolerated            Diet       Follow this diet upon discharge: regular                  Follow-up Appointments     Follow-up and recommended labs and tests        Follow up with primary MD for routine check up after hospitalization in 5-7 days  Keep cholecystostomy tube in place for at least 6 weeks  Follow up with Dr. Mckeon of general surgery in 4-6 weeks.  Call [phone] to arrange appintment                  Additional Services     Home Care PT Referral for Hospital Discharge       PT to eval and treat    Your provider has ordered home care - physical therapy. If  you have not been contacted within 2 days of your discharge please call the department phone number listed on the top of this document.            Home care nursing referral       RN skilled nursing visit. RN to teach cholecystostomy tube management.    Your provider has ordered home care nursing services. If you have not been contacted within 2 days of your discharge please call the inpatient department phone number at [phone] .                  Further instructions from your care team       Special instructions for Pamela ANGELIA Wagnerangelica:  Dr. Mckeon ([facility]) [phone]  - No specific follow up needed at this time.     -  Cholecystostomy Drain:  Keep dressing Dry  Look at incision and drain site every day to monitor for changes.   Change dressing weekly; you can call the Radiology Nurse line to make an appointment: [phone]      Take you antibiotic as directed until dose is complete.   Take with food to prevent upset stomach.  Eat yogurt daily as a form of a probiotic while taking your antibiotic.     When to call your health care provider  Call your health care provider if you notice any of these changes:    The amount of fluid increases or decreases suddenly.    Large amount of blood or a clot in drainage.    The color, odor, or thickness of the fluid changes.    The tube falls out or the incision opens.    The skin around the drain is red, swollen, painful, or seeping pus.    You have a fever over 101.5 F (38.6 C) or chills.      Pending Results     Date and Time Order Name Status Description    1/25/2017 1541 Anaerobic bacterial culture Preliminary     1/25/2017 1510 Drainage Culture Aerobic Bacterial Preliminary             Statement of Approval     Ordered          01/27/17 1107  I have reviewed and agree with all the recommendations and orders detailed in this document.   EFFECTIVE NOW     Approved and electronically signed by:  Rocky Delgadillo MD             Admission  "Information        Provider Department Dept Phone    2017 Balaji Linn MD  [facility] [phone]      Your Vitals Were     Blood Pressure Pulse Temperature    164/72 mmHg 70 98.6  F (37  C) (Oral)    Respirations Height Weight    18 1.448 m (4' 9\") 64.864 kg (143 lb)    BMI (Body Mass Index) Pulse Oximetry       30.94 kg/m2 96%       MyChart Information     [employer] lets you send messages to your doctor, view your test results, renew your prescriptions, schedule appointments and more. To sign up, go to [email or web address] . Click on \"Log in\" on the left side of the screen, which will take you to the Welcome page. Then click on \"Sign up Now\" on the right side of the page.     You will be asked to enter the access code listed below, as well as some personal information. Please follow the directions to create your username and password.     Your access code is: [email or web address]  Expires: 2017  9:49 AM     Your access code will  in 90 days. If you need help or a new code, please call your [facility] clinic or [phone].        Care EveryWhere ID     This is your Care EveryWhere ID. This could be used by other organizations to access your [facility] medical records  [id number]           Review of your medicines      START taking        Dose / Directions    ciprofloxacin 500 MG tablet   Commonly known as:  CIPRO   Used for:  Acute cholecystitis        Dose:  500 mg   Take 1 tablet (500 mg) by mouth 2 times daily for 12 days   Quantity:  24 tablet   Refills:  0         CONTINUE these medicines which have NOT CHANGED        Dose / Directions    aspirin 81 MG tablet        Dose:  81 mg   Take 81 mg by mouth daily   Refills:  0       atorvastatin 40 MG tablet   Commonly known as:  LIPITOR        Dose:  40 mg   Take 40 mg by mouth daily   Refills:  0       calcium-vitamin D 600-400 MG-UNIT per tablet   Commonly known as:  CALTRATE        Dose:  1 tablet   Take 1 tablet by mouth 2 times " daily   Refills:  0       CENTRUM SILVER per tablet        Dose:  1 tablet   Take 1 tablet by mouth daily   Refills:  0       clopidogrel 75 MG tablet   Commonly known as:  PLAVIX        Dose:  75 mg   Take 75 mg by mouth daily   Refills:  0       isosorbide mononitrate 60 MG 24 hr tablet   Commonly known as:  IMDUR        Dose:  60 mg   Take 60 mg by mouth daily   Refills:  0       levothyroxine 75 MCG tablet   Commonly known as:  SYNTHROID/LEVOTHROID        Dose:  75 mcg   Take 75 mcg by mouth daily   Refills:  0       metoprolol 25 MG tablet   Commonly known as:  LOPRESSOR        Dose:  25 mg   Take 25 mg by mouth 2 times daily   Refills:  0       NITROTAB SL        Dose:  0.4 mg   Place 0.4 mg under the tongue   Refills:  0       pantoprazole 20 MG EC tablet   Commonly known as:  PROTONIX        Dose:  20 mg   Take 20 mg by mouth daily   Refills:  0            Where to get your medicines      These medications were sent to [place] [address]     Phone:  [phone]    - ciprofloxacin 500 MG tablet             Protect others around you: Learn how to safely use, store and throw away your medicines at www.disposemymeds.org.             Medication List: This is a list of all your medications and when to take them. Check marks below indicate your daily home schedule. Keep this list as a reference.      Medications           Morning Afternoon Evening Bedtime As Needed    aspirin 81 MG tablet   Take 81 mg by mouth daily                                   atorvastatin 40 MG tablet   Commonly known as:  LIPITOR   Take 40 mg by mouth daily   Last time this was given:  40 mg on 1/27/2017  8:10 AM                                   calcium-vitamin D 600-400 MG-UNIT per tablet   Commonly known as:  CALTRATE   Take 1 tablet by mouth 2 times daily                                      CENTRUM SILVER per tablet   Take 1 tablet by  mouth daily                                   ciprofloxacin 500 MG tablet   Commonly known as:  CIPRO   Take 1 tablet (500 mg) by mouth 2 times daily for 12 days            Take with food to prevent upset stomach.              Start this evening                 clopidogrel 75 MG tablet   Commonly known as:  PLAVIX   Take 75 mg by mouth daily   Last time this was given:  75 mg on 1/27/2017  8:10 AM                                   isosorbide mononitrate 60 MG 24 hr tablet   Commonly known as:  IMDUR   Take 60 mg by mouth daily   Last time this was given:  60 mg on 1/27/2017  8:10 AM                                   levothyroxine 75 MCG tablet   Commonly known as:  SYNTHROID/LEVOTHROID   Take 75 mcg by mouth daily   Last time this was given:  75 mcg on 1/27/2017  6:45 AM                                   metoprolol 25 MG tablet   Commonly known as:  LOPRESSOR   Take 25 mg by mouth 2 times daily   Last time this was given:  25 mg on 1/27/2017  8:10 AM                                      NITROTAB SL   Place 0.4 mg under the tongue                                   pantoprazole 20 MG EC tablet   Commonly known as:  PROTONIX   Take 20 mg by mouth daily

## 2017-01-24 NOTE — IP AVS SNAPSHOT
[facility] [address] [facility]    [address]    [facility] [address]    Phone:  [phone]    Fax:  [phone]                                       After Visit Summary   1/24/2017    Pamela Garcia    MRN: [id number]           After Visit Summary Signature Page     I have received my discharge instructions, and my questions have been answered. I have discussed any challenges I see with this plan with the nurse or doctor.    ..........................................................................................................................................  Patient/Patient Representative Signature      ..........................................................................................................................................  Patient Representative Print Name and Relationship to Patient    ..................................................               ................................................  Date                                            Time    ..........................................................................................................................................  Reviewed by Signature/Title    ...................................................              ..............................................  Date                                                            Time

## 2017-01-25 ENCOUNTER — APPOINTMENT (OUTPATIENT)
Dept: OCCUPATIONAL THERAPY | Facility: CLINIC | Age: 82
DRG: 872 | End: 2017-01-25
Attending: PHYSICIAN ASSISTANT
Payer: MEDICARE

## 2017-01-25 ENCOUNTER — APPOINTMENT (OUTPATIENT)
Dept: GENERAL RADIOLOGY | Facility: CLINIC | Age: 82
DRG: 872 | End: 2017-01-25
Attending: SURGERY
Payer: MEDICARE

## 2017-01-25 PROBLEM — R50.9 FEBRILE ILLNESS, ACUTE: Status: ACTIVE | Noted: 2017-01-25

## 2017-01-25 LAB
ALBUMIN SERPL-MCNC: 2.1 G/DL (ref 3.4–5)
ALP SERPL-CCNC: 414 U/L (ref 40–150)
ALT SERPL W P-5'-P-CCNC: 287 U/L (ref 0–50)
ANION GAP SERPL CALCULATED.3IONS-SCNC: 10 MMOL/L (ref 3–14)
AST SERPL W P-5'-P-CCNC: 343 U/L (ref 0–45)
BACTERIA SPEC CULT: NORMAL
BASOPHILS # BLD AUTO: 0 10E9/L (ref 0–0.2)
BASOPHILS NFR BLD AUTO: 0.1 %
BILIRUB DIRECT SERPL-MCNC: 1.7 MG/DL (ref 0–0.2)
BILIRUB SERPL-MCNC: 2.3 MG/DL (ref 0.2–1.3)
BUN SERPL-MCNC: 17 MG/DL (ref 7–30)
CALCIUM SERPL-MCNC: 8.1 MG/DL (ref 8.5–10.1)
CHLORIDE SERPL-SCNC: 105 MMOL/L (ref 94–109)
CO2 SERPL-SCNC: 26 MMOL/L (ref 20–32)
CREAT SERPL-MCNC: 0.6 MG/DL (ref 0.52–1.04)
DIFFERENTIAL METHOD BLD: ABNORMAL
EOSINOPHIL # BLD AUTO: 0 10E9/L (ref 0–0.7)
EOSINOPHIL NFR BLD AUTO: 0 %
ERYTHROCYTE [DISTWIDTH] IN BLOOD BY AUTOMATED COUNT: 17.4 % (ref 10–15)
GFR SERPL CREATININE-BSD FRML MDRD: ABNORMAL ML/MIN/1.7M2
GLUCOSE SERPL-MCNC: 144 MG/DL (ref 70–99)
GRAM STN SPEC: ABNORMAL
HCT VFR BLD AUTO: 27.2 % (ref 35–47)
HGB BLD-MCNC: 8.5 G/DL (ref 11.7–15.7)
IMM GRANULOCYTES # BLD: 0.2 10E9/L (ref 0–0.4)
IMM GRANULOCYTES NFR BLD: 1.4 %
INTERPRETATION ECG - MUSE: NORMAL
LACTATE BLD-SCNC: 1.9 MMOL/L (ref 0.7–2.1)
LACTATE SERPL-SCNC: 1.6 MMOL/L (ref 0.4–2)
LYMPHOCYTES # BLD AUTO: 0.9 10E9/L (ref 0.8–5.3)
LYMPHOCYTES NFR BLD AUTO: 5.7 %
MCH RBC QN AUTO: 25 PG (ref 26.5–33)
MCHC RBC AUTO-ENTMCNC: 31.3 G/DL (ref 31.5–36.5)
MCV RBC AUTO: 80 FL (ref 78–100)
MICRO REPORT STATUS: ABNORMAL
MICRO REPORT STATUS: NORMAL
MONOCYTES # BLD AUTO: 1.2 10E9/L (ref 0–1.3)
MONOCYTES NFR BLD AUTO: 7.2 %
NEUTROPHILS # BLD AUTO: 13.8 10E9/L (ref 1.6–8.3)
NEUTROPHILS NFR BLD AUTO: 85.6 %
NRBC # BLD AUTO: 0 10*3/UL
NRBC BLD AUTO-RTO: 0 /100
PLATELET # BLD AUTO: 273 10E9/L (ref 150–450)
POTASSIUM SERPL-SCNC: 3.6 MMOL/L (ref 3.4–5.3)
PROT SERPL-MCNC: 6.5 G/DL (ref 6.8–8.8)
RBC # BLD AUTO: 3.4 10E12/L (ref 3.8–5.2)
SODIUM SERPL-SCNC: 141 MMOL/L (ref 133–144)
SPECIMEN SOURCE: ABNORMAL
SPECIMEN SOURCE: NORMAL
WBC # BLD AUTO: 16.1 10E9/L (ref 4–11)

## 2017-01-25 PROCEDURE — 83605 ASSAY OF LACTIC ACID: CPT | Performed by: INTERNAL MEDICINE

## 2017-01-25 PROCEDURE — 36415 COLL VENOUS BLD VENIPUNCTURE: CPT | Performed by: PHYSICIAN ASSISTANT

## 2017-01-25 PROCEDURE — 25000132 ZZH RX MED GY IP 250 OP 250 PS 637: Mod: GY | Performed by: PHYSICIAN ASSISTANT

## 2017-01-25 PROCEDURE — 80076 HEPATIC FUNCTION PANEL: CPT | Performed by: PHYSICIAN ASSISTANT

## 2017-01-25 PROCEDURE — 25000132 ZZH RX MED GY IP 250 OP 250 PS 637: Mod: GY | Performed by: INTERNAL MEDICINE

## 2017-01-25 PROCEDURE — A9270 NON-COVERED ITEM OR SERVICE: HCPCS | Mod: GY | Performed by: INTERNAL MEDICINE

## 2017-01-25 PROCEDURE — 97535 SELF CARE MNGMENT TRAINING: CPT | Mod: GO | Performed by: REHABILITATION PRACTITIONER

## 2017-01-25 PROCEDURE — 85025 COMPLETE CBC W/AUTO DIFF WBC: CPT | Performed by: PHYSICIAN ASSISTANT

## 2017-01-25 PROCEDURE — 87070 CULTURE OTHR SPECIMN AEROBIC: CPT | Performed by: RADIOLOGY

## 2017-01-25 PROCEDURE — 83605 ASSAY OF LACTIC ACID: CPT | Performed by: PHYSICIAN ASSISTANT

## 2017-01-25 PROCEDURE — 87205 SMEAR GRAM STAIN: CPT | Performed by: RADIOLOGY

## 2017-01-25 PROCEDURE — 97165 OT EVAL LOW COMPLEX 30 MIN: CPT | Mod: GO | Performed by: REHABILITATION PRACTITIONER

## 2017-01-25 PROCEDURE — A9270 NON-COVERED ITEM OR SERVICE: HCPCS | Mod: GY | Performed by: PHYSICIAN ASSISTANT

## 2017-01-25 PROCEDURE — 0F9430Z DRAINAGE OF GALLBLADDER WITH DRAINAGE DEVICE, PERCUTANEOUS APPROACH: ICD-10-PCS | Performed by: RADIOLOGY

## 2017-01-25 PROCEDURE — 25000125 ZZHC RX 250: Performed by: EMERGENCY MEDICINE

## 2017-01-25 PROCEDURE — 97166 OT EVAL MOD COMPLEX 45 MIN: CPT | Mod: GO | Performed by: REHABILITATION PRACTITIONER

## 2017-01-25 PROCEDURE — 99233 SBSQ HOSP IP/OBS HIGH 50: CPT | Performed by: INTERNAL MEDICINE

## 2017-01-25 PROCEDURE — 47533 PLMT BILIARY DRAINAGE CATH: CPT

## 2017-01-25 PROCEDURE — 99221 1ST HOSP IP/OBS SF/LOW 40: CPT | Performed by: SURGERY

## 2017-01-25 PROCEDURE — 40000133 ZZH STATISTIC OT WARD VISIT: Performed by: REHABILITATION PRACTITIONER

## 2017-01-25 PROCEDURE — 87186 SC STD MICRODIL/AGAR DIL: CPT | Performed by: RADIOLOGY

## 2017-01-25 PROCEDURE — 25000125 ZZHC RX 250: Performed by: PHYSICIAN ASSISTANT

## 2017-01-25 PROCEDURE — 87075 CULTR BACTERIA EXCEPT BLOOD: CPT | Performed by: RADIOLOGY

## 2017-01-25 PROCEDURE — 12000007 ZZH R&B INTERMEDIATE

## 2017-01-25 PROCEDURE — 80048 BASIC METABOLIC PNL TOTAL CA: CPT | Performed by: PHYSICIAN ASSISTANT

## 2017-01-25 PROCEDURE — 25000125 ZZHC RX 250: Performed by: RADIOLOGY

## 2017-01-25 PROCEDURE — 25000128 H RX IP 250 OP 636: Performed by: INTERNAL MEDICINE

## 2017-01-25 PROCEDURE — 87077 CULTURE AEROBIC IDENTIFY: CPT | Performed by: RADIOLOGY

## 2017-01-25 PROCEDURE — 36415 COLL VENOUS BLD VENIPUNCTURE: CPT | Performed by: INTERNAL MEDICINE

## 2017-01-25 PROCEDURE — 25500064 ZZH RX 255 OP 636: Performed by: INTERNAL MEDICINE

## 2017-01-25 RX ORDER — ATORVASTATIN CALCIUM 40 MG/1
40 TABLET, FILM COATED ORAL DAILY
Status: DISCONTINUED | OUTPATIENT
Start: 2017-01-25 | End: 2017-01-27 | Stop reason: HOSPADM

## 2017-01-25 RX ORDER — NALOXONE HYDROCHLORIDE 0.4 MG/ML
.1-.4 INJECTION, SOLUTION INTRAMUSCULAR; INTRAVENOUS; SUBCUTANEOUS
Status: DISCONTINUED | OUTPATIENT
Start: 2017-01-25 | End: 2017-01-27 | Stop reason: HOSPADM

## 2017-01-25 RX ORDER — ASPIRIN 81 MG/1
81 TABLET ORAL DAILY
Status: DISCONTINUED | OUTPATIENT
Start: 2017-01-25 | End: 2017-01-27 | Stop reason: HOSPADM

## 2017-01-25 RX ORDER — HYDROMORPHONE HCL/0.9% NACL/PF 0.2MG/0.2
0.2 SYRINGE (ML) INTRAVENOUS
Status: DISCONTINUED | OUTPATIENT
Start: 2017-01-25 | End: 2017-01-27 | Stop reason: HOSPADM

## 2017-01-25 RX ORDER — FENTANYL CITRATE 50 UG/ML
25-50 INJECTION, SOLUTION INTRAMUSCULAR; INTRAVENOUS EVERY 5 MIN PRN
Status: DISCONTINUED | OUTPATIENT
Start: 2017-01-25 | End: 2017-01-27 | Stop reason: HOSPADM

## 2017-01-25 RX ORDER — ONDANSETRON 4 MG/1
4 TABLET, ORALLY DISINTEGRATING ORAL EVERY 6 HOURS PRN
Status: DISCONTINUED | OUTPATIENT
Start: 2017-01-25 | End: 2017-01-27 | Stop reason: HOSPADM

## 2017-01-25 RX ORDER — METOPROLOL TARTRATE 25 MG/1
25 TABLET, FILM COATED ORAL 2 TIMES DAILY
Status: DISCONTINUED | OUTPATIENT
Start: 2017-01-25 | End: 2017-01-27 | Stop reason: HOSPADM

## 2017-01-25 RX ORDER — SODIUM CHLORIDE 9 MG/ML
INJECTION, SOLUTION INTRAVENOUS CONTINUOUS
Status: DISCONTINUED | OUTPATIENT
Start: 2017-01-25 | End: 2017-01-26

## 2017-01-25 RX ORDER — ACETAMINOPHEN 325 MG/1
650 TABLET ORAL EVERY 4 HOURS PRN
Status: DISCONTINUED | OUTPATIENT
Start: 2017-01-25 | End: 2017-01-27 | Stop reason: HOSPADM

## 2017-01-25 RX ORDER — ONDANSETRON 2 MG/ML
4 INJECTION INTRAMUSCULAR; INTRAVENOUS EVERY 6 HOURS PRN
Status: DISCONTINUED | OUTPATIENT
Start: 2017-01-25 | End: 2017-01-27 | Stop reason: HOSPADM

## 2017-01-25 RX ORDER — LEVOTHYROXINE SODIUM 75 UG/1
75 TABLET ORAL
Status: DISCONTINUED | OUTPATIENT
Start: 2017-01-25 | End: 2017-01-27 | Stop reason: HOSPADM

## 2017-01-25 RX ORDER — FENTANYL CITRATE 50 UG/ML
INJECTION, SOLUTION INTRAMUSCULAR; INTRAVENOUS
Status: DISPENSED
Start: 2017-01-25 | End: 2017-01-26

## 2017-01-25 RX ORDER — IOPAMIDOL 612 MG/ML
30 INJECTION, SOLUTION INTRAVASCULAR ONCE
Status: COMPLETED | OUTPATIENT
Start: 2017-01-25 | End: 2017-01-25

## 2017-01-25 RX ORDER — LIDOCAINE HYDROCHLORIDE 10 MG/ML
1-30 INJECTION, SOLUTION EPIDURAL; INFILTRATION; INTRACAUDAL; PERINEURAL
Status: COMPLETED | OUTPATIENT
Start: 2017-01-25 | End: 2017-01-25

## 2017-01-25 RX ORDER — PROCHLORPERAZINE 25 MG
12.5 SUPPOSITORY, RECTAL RECTAL EVERY 12 HOURS PRN
Status: DISCONTINUED | OUTPATIENT
Start: 2017-01-25 | End: 2017-01-27 | Stop reason: HOSPADM

## 2017-01-25 RX ORDER — CLOPIDOGREL BISULFATE 75 MG/1
75 TABLET ORAL DAILY
Status: DISCONTINUED | OUTPATIENT
Start: 2017-01-25 | End: 2017-01-27 | Stop reason: HOSPADM

## 2017-01-25 RX ORDER — ISOSORBIDE MONONITRATE 30 MG/1
60 TABLET, EXTENDED RELEASE ORAL DAILY
Status: DISCONTINUED | OUTPATIENT
Start: 2017-01-25 | End: 2017-01-27 | Stop reason: HOSPADM

## 2017-01-25 RX ORDER — NITROGLYCERIN 0.4 MG/1
0.4 TABLET SUBLINGUAL EVERY 5 MIN PRN
Status: DISCONTINUED | OUTPATIENT
Start: 2017-01-25 | End: 2017-01-27 | Stop reason: HOSPADM

## 2017-01-25 RX ORDER — PROCHLORPERAZINE MALEATE 5 MG
5 TABLET ORAL EVERY 6 HOURS PRN
Status: DISCONTINUED | OUTPATIENT
Start: 2017-01-25 | End: 2017-01-27 | Stop reason: HOSPADM

## 2017-01-25 RX ORDER — FLUMAZENIL 0.1 MG/ML
0.2 INJECTION, SOLUTION INTRAVENOUS
Status: DISCONTINUED | OUTPATIENT
Start: 2017-01-25 | End: 2017-01-27 | Stop reason: HOSPADM

## 2017-01-25 RX ADMIN — IOPAMIDOL 30 ML: 612 INJECTION, SOLUTION INTRAVENOUS at 15:18

## 2017-01-25 RX ADMIN — ACETAMINOPHEN 650 MG: 325 TABLET, FILM COATED ORAL at 19:53

## 2017-01-25 RX ADMIN — SODIUM CHLORIDE: 9 INJECTION, SOLUTION INTRAVENOUS at 20:52

## 2017-01-25 RX ADMIN — LIDOCAINE HYDROCHLORIDE 20 MG: 10 INJECTION, SOLUTION EPIDURAL; INFILTRATION; INTRACAUDAL; PERINEURAL at 14:43

## 2017-01-25 RX ADMIN — FENTANYL CITRATE 25 MCG: 50 INJECTION INTRAMUSCULAR; INTRAVENOUS at 14:51

## 2017-01-25 RX ADMIN — MIDAZOLAM 0.5 MG: 1 INJECTION INTRAMUSCULAR; INTRAVENOUS at 14:43

## 2017-01-25 RX ADMIN — TAZOBACTAM SODIUM AND PIPERACILLIN SODIUM 3.38 G: 375; 3 INJECTION, SOLUTION INTRAVENOUS at 18:22

## 2017-01-25 RX ADMIN — TAZOBACTAM SODIUM AND PIPERACILLIN SODIUM 3.38 G: 375; 3 INJECTION, SOLUTION INTRAVENOUS at 05:59

## 2017-01-25 RX ADMIN — FENTANYL CITRATE 25 MCG: 50 INJECTION INTRAMUSCULAR; INTRAVENOUS at 14:43

## 2017-01-25 RX ADMIN — MIDAZOLAM 0.5 MG: 1 INJECTION INTRAMUSCULAR; INTRAVENOUS at 14:50

## 2017-01-25 RX ADMIN — PANTOPRAZOLE SODIUM 40 MG: 40 INJECTION, POWDER, FOR SOLUTION INTRAVENOUS at 06:55

## 2017-01-25 RX ADMIN — ASPIRIN 81 MG: 81 TABLET, COATED ORAL at 11:02

## 2017-01-25 RX ADMIN — Medication 2.5 MG: at 01:57

## 2017-01-25 RX ADMIN — ISOSORBIDE MONONITRATE 60 MG: 30 TABLET, EXTENDED RELEASE ORAL at 10:28

## 2017-01-25 RX ADMIN — TAZOBACTAM SODIUM AND PIPERACILLIN SODIUM 3.38 G: 375; 3 INJECTION, SOLUTION INTRAVENOUS at 12:26

## 2017-01-25 RX ADMIN — Medication 2.5 MG: at 22:11

## 2017-01-25 RX ADMIN — TAZOBACTAM SODIUM AND PIPERACILLIN SODIUM 3.38 G: 375; 3 INJECTION, SOLUTION INTRAVENOUS at 00:24

## 2017-01-25 RX ADMIN — CLOPIDOGREL 75 MG: 75 TABLET, FILM COATED ORAL at 11:02

## 2017-01-25 RX ADMIN — SODIUM CHLORIDE: 9 INJECTION, SOLUTION INTRAVENOUS at 01:59

## 2017-01-25 RX ADMIN — LEVOTHYROXINE SODIUM 75 MCG: 75 TABLET ORAL at 06:43

## 2017-01-25 RX ADMIN — ATORVASTATIN CALCIUM 40 MG: 40 TABLET, FILM COATED ORAL at 10:30

## 2017-01-25 RX ADMIN — METOPROLOL TARTRATE 25 MG: 25 TABLET, FILM COATED ORAL at 20:49

## 2017-01-25 RX ADMIN — METOPROLOL TARTRATE 25 MG: 25 TABLET, FILM COATED ORAL at 10:27

## 2017-01-25 ASSESSMENT — ACTIVITIES OF DAILY LIVING (ADL): PREVIOUS_RESPONSIBILITIES: MEAL PREP;LAUNDRY;SHOPPING;MEDICATION MANAGEMENT;FINANCES;DRIVING

## 2017-01-25 NOTE — ED NOTES
Pt arrives via EMS from home where she is staying with her daughter otherwise she lives by herself. Pt was in her bed when she tried to stand up and got tangled and fell per daughters. Pt was then difficult to get back up and was experiencing generalized weakness. Pt has a H/O of a UTI in December. For EMS pt had 2 periods of confusion for them with 2 periods of complete alertness. Small left facial droop noted. Pt denies hitting head. Pt having trouble getting words out.

## 2017-01-25 NOTE — PROGRESS NOTES
CM: Aware of sws consult.  PT is off the floor at procedure at this time will follow up tomorrow.

## 2017-01-25 NOTE — PLAN OF CARE
Problem: Goal Outcome Summary  Goal: Goal Outcome Summary    PT:  Orders received.  Noted plan for placement of a cholecystostomy tube today, spoke with family who is in agreement to hold PT for today. Will reschedule for tomorrow.  Per family, pt uses a walker or hand hold assist at baseline, will issue FWW to room for use while here.

## 2017-01-25 NOTE — PROGRESS NOTES
01/25/17 0748   Quick Adds   Type of Visit Initial Occupational Therapy Evaluation   Living Environment   Lives With alone   Living Arrangements condominium   Home Accessibility tub/shower is not walk in   Number of Stairs to Enter Home 0   Number of Stairs Within Home 0   Transportation Available car  (patient reports she drives herself when at home)   Living Environment Comment Patient typically resides in condo alone in [place]. Is staying with her dsaughter for the month of January. Daughter lives in a split entry home- 1 step in from garage and patient thinks 10 steps up to upper [place]. Typically, she stays on upper level when daughter is at work. Patient reports her other daughter could come stay with her when when daughter she is staying with is at work.   Self-Care   Usual Activity Tolerance fair   Current Activity Tolerance fair   Regular Exercise no   Equipment Currently Used at Home cane, straight;grab bar;bath bench  (4ww)   Activity/Exercise/Self-Care Comment Patient reports she is I with basic ADL's and IADL's, has A with housecleaning 2x/month. Was driving PTA and managing cooking and laundry as well as errands   Functional Level Prior   Ambulation 0-->independent  (at times uses SEC in condo and 4ww in community)   Transferring 0-->independent   Toileting 0-->independent   Bathing 1-->assistive equipment   Dressing 0-->independent   Eating 0-->independent   Communication 0-->understands/communicates without difficulty   Swallowing 0-->swallows foods/liquids without difficulty   Cognition 0 - no cognition issues reported   Fall history within last six months yes   Number of times patient has fallen within last six months 1   Which of the above functional risks had a recent onset or change? ambulation;transferring;toileting;bathing;dressing   Prior Functional Level Comment Patient is very [place]   General Information   Onset of Illness/Injury or Date of Surgery - Date 01/24/17   Referring Physician  "Terri Mac PA-C   Patient/Family Goals Statement to return home   Additional Occupational Profile Info/Pertinent History of Current Problem Pamela Garcia is a 89 year old female who presents to the emergency department today via EMS for evaluation of a fall and generalized weakness. The patient is currently visiting from [place], [place] and living with her daughter. Per her daughter's, the patient has been having a hard time getting off the couch and walking. The daughter left the house at 0630 this morning and the patient was alone throughout the day, although this is a normal routine. She returned to the house around 1400 and found the patient on the floor near the bed with a presumed fall out of bed. She could not lift the patient back up, therefore EMS was called and the patient was brought to the ED for further evaluation. Per EMS, the patient had two episodes of confusion and trouble talking which is different per daughters. Upon presentation, the family notes the patient has \"strong smelling urine\" as well as shaking a little bit. They believe the patient may not have eaten lunch today either. Per MD note on 1/25- \"Key management decisions made by me: given fever, abnormal LFTs, AMS, RUQ abd pain certainly she has cholangitis but has responded very well to initial treatments and now does not appear toxic and hemodynamics/lactic acid are normalizing.  Her family favors conservative management in the short term, particularly given improvement.  We'll continue IV abx, fluids and supportive cares but certainly its reasonable to have her seen by surgery to discuss options.  Thankfully, there doesn't seem to be CBD obstruction as a proximal cause of her cholangitis/cholecystitis so I doubt ERCP would be of benefit at this time.\"   Precautions/Limitations fall precautions   General Observations patient was in bed and agreeable to OT session, hoping to dc home today   Cognitive Status Examination   Orientation " orientation to person, place and time  (unable to name city/name of hospital, but pt not from here)   Level of Consciousness alert   Able to Follow Commands WNL/WFL   Personal Safety (Cognitive) decreased awareness, need for assist  (not fully attending to IV lines with mobility- cues needed)   Memory intact   Cognitive Comment patient reports she was not aware she had fallen at daughter's home, feeling more alert and cognitively intact right now- pt. report   Visual Perception   Visual Perception Wears glasses   Pain Assessment   Patient Currently in Pain No   Posture   Posture kyphosis   Range of Motion (ROM)   ROM Comment limite in B shoulders for flexion and abduction- able ot manage functionally, suspect baseline   Strength   Strength Comments decreased strength not through fucntional activity- unsteady with mobuility, using IV pole or room furniture for support. Patient also reports shakiness with activity.    Hand Strength   Hand Strength Comments suspect baseline, intact functionally   Muscle Tone Assessment   Muscle Tone Quick Adds No deficits were identified   Coordination   Upper Extremity Coordination No deficits were identified   Mobility   Bed Mobility Comments mod I with bed mobility with HOB elevated to incline and use of bed rails for support for supine to sit transfer   Transfer Skill: Bed to Chair/Chair to Bed   Level of [place]: Bed to Chair (treatment initiated-defer to OT daily note for details)   Transfer Skill: Sit to Stand   Level of [place]: Sit/Stand contact guard   Physical Assist/Nonphysical Assist: Sit/Stand supervision;verbal cues   Transfer Skill: Sit to Stand full weight-bearing   Assistive Device for Transfer: Sit/Stand other (see comments)  (iv pole for support)   Toilet Transfer   Toilet Transfer Comments treatment initiated-defer to OT daily note for details   Balance   Balance Comments no LOB, however unsteadiness noted with mobility   Grooming   Level of [place]:  "Grooming (treatment initiated-defer to OT daily note for details)   Instrumental Activities of Daily Living (IADL)   Previous Responsibilities meal prep;laundry;shopping;medication management;finances;driving   IADL Comments patient has housekeeping services 2x/month, otherwise has been I with IADL's   Activities of Daily Living Analysis   Impairments Contributing to Impaired Activities of Daily Living balance impaired;ROM decreased;strength decreased   General Therapy Interventions   Planned Therapy Interventions ADL retraining;progressive activity/exercise;transfer training;strengthening   Clinical Impression   Criteria for Skilled Therapeutic Interventions Met yes, treatment indicated   OT Diagnosis decreased ADL's   Influenced by the following impairments balance impaired;ROM decreased;strength decreased   Assessment of Occupational Performance 3-5 Performance Deficits   Identified Performance Deficits basic ADL's- dressing, toileting, bathing ,sinkside cares, IADL's- cooking, laundry, driving   Clinical Decision Making (Complexity) Moderate complexity   Therapy Frequency daily   Predicted Duration of Therapy Intervention (days/wks) 3-4 days   Anticipated Discharge Disposition Home with Home Therapy  (24 hour supervision for safety)   Risks and Benefits of Treatment have been explained. Yes   Patient, Family & other staff in agreement with plan of care Yes   [facility] TM \"6 Clicks\"   2016, Trustees of [facility], under license to [employer].  All rights reserved.   6 Clicks Short Forms Daily Activity Inpatient Short Form   [facility]  \"6 Clicks\" Daily Activity Inpatient Short Form   1. Putting on and taking off regular lower body clothing? 3 - A Little   2. Bathing (including washing, rinsing, drying)? 3 - A Little   3. Toileting, which includes using toilet, bedpan or urinal? 3 - A Little   4. Putting on and taking off regular upper body clothing? 3 - A Little   5. Taking " care of personal grooming such as brushing teeth? 3 - A Little   6. Eating meals? 4 - None   Daily Activity Raw Score (Score out of 24.Lower scores equate to lower levels of function) 19   Total Evaluation Time   Total Evaluation Time (Minutes) 12

## 2017-01-25 NOTE — CONSULTS
General Surgery Consultation    Pamela Garcia MRN# [id number]   Age: 89 year old YOB: 1925     Date of Admission:  1/24/2017    Reason for consult:            cholecystitis       Requesting physician:            Dr. Singh                Assessment and Plan:   Assessment:   Sepsis secondary to acute calculous cholecystitis. Elevated LFTs and Tbil. without biliary dilation.      Plan:   I have recommend continued IV antibiotics and percutaneous drainage of the GB by IR.  Surgery should be avoided as the pt has excessively high surgical risk, having an MI and 5 stents placed 3 months ago.    I have discussed this case with the pt, her two daughters and her son.  They are in agreement with this plan.  I have discussed this case with Dr. Richard of interventional radiology.  He agrees to attempt placement of a cholecystostomy tube today.  I have discussed this case with Dr. Singh of [facility] as well.    The tube should  remian in place for at least six weeks.  The patient and family may elect to continue with percutaneous drainage until definitive surgical treatment is deemed an acceptable risk or indefinitely.  They understand the risk of recurrent cholecystitis if the tube is removed.                   Chief Complaint:   Weakness, MS changes.     History is obtained from the patient and the patient's 2 daughters and her son.         History of Present Illness:   This patient is a 89 year old  female with a h/o CAD s/p MI three months ago who presents with weakness and MS changes. She denies abdominal pain.  Per the patient's daughter the patient was confused yesterday, weak, and had difficulty getting out of bed.  The patient denies fever, chills, nausea, vomiting, and diarrhea.  Of note, the patient is visiting her daughter.  She resides in [place] and a [place].  She had an MI 3 months ago and thereafter had 5 stents placed.  She continues to be on dual agent antiplatelet therapy.  She  " does not have a history of jaundice or dark urine.  She  has not had pancreatitis in the past.              Past Medical History:    has a past medical history of GI bleed; H/O heart artery stent; and Arthritis.          Past Surgical History:     Past Surgical History   Procedure Laterality Date     Cardiac surgery       stents     Gyn surgery               Social History:     Social History   Substance Use Topics     Smoking status: Not on file     Smokeless tobacco: Not on file     Alcohol Use: Not on file             Family History:   Noncontributory         Allergies:   All allergies reviewed and addressed          Medications:     No current facility-administered medications on file prior to encounter.  No current outpatient prescriptions on file prior to encounter.    atorvastatin  40 mg Oral Daily     aspirin EC  81 mg Oral Daily     clopidogrel  75 mg Oral Daily     isosorbide mononitrate  60 mg Oral Daily     levothyroxine  75 mcg Oral QAM AC     metoprolol  25 mg Oral BID     piperacillin-tazobactam  3.375 g Intravenous Q6H     pantoprazole  40 mg Intravenous QAM AC            Review of Systems:   The 10 point review of systems is negative other than noted in the HPI.          Physical Exam:   /70 mmHg  Pulse 106  Temp(Src) 98.4  F (36.9  C) (Oral)  Resp 18  Ht 1.448 m (4' 9\")  Wt 63.504 kg (140 lb)  BMI 30.29 kg/m2  SpO2 94%  General - Well developed, well nourished elderly female in no apparent distress  HEENT:  Head normocephalic and atraumatic, pupils equal and round, conjunctivae clear, no scleral icterus, mucous membranes moist, external ears and nose normal.  Extremely [place].  Neck: Supple without thyromegaly or masses  Lymphatic: No cervical, or supraclavicular lymphadenopathy  Lungs: Clear to auscultation bilaterally  Heart: regular rate and rhythm, no murmurs  Abdomen:   soft, obese, non-distended with mild tenderness noted in the right upper quadrant . no masses " palpated  Extremities: Warm without edema  Neurologic: nonfocal  Psychiatric: Mood and affect appropriate  Skin: Without lesions, rashes, or juandice         Data:   WBC -   WBC   Date Value Ref Range Status   01/25/2017 16.1* 4.0 - 11.0 10e9/L Final   ], HgB - HEMOGLOBIN   Date Value Ref Range Status   01/25/2017 8.5* 11.7 - 15.7 g/dL Final   ]   Liver Function Studies -   Recent Labs   Lab Test  01/25/17   0620   PROTTOTAL  6.5*   ALBUMIN  2.1*   BILITOTAL  2.3*   ALKPHOS  414*   AST  343*   ALT  287*         Imaging:  All imaging studies reviewed by me.    Results for orders placed or performed during the hospital encounter of 01/24/17   CT Head w/o Contrast    Narrative    CT SCAN OF THE HEAD WITHOUT CONTRAST   1/24/2017 7:35 PM     HISTORY: Head injury. Patient on Plavix.    TECHNIQUE:  Axial images of the head and coronal reformations without  IV contrast material. Radiation dose for this scan was reduced using  automated exposure control, adjustment of the mA and/or kV according  to patient size, or iterative reconstruction technique.    COMPARISON: None.    FINDINGS:  There is generalized atrophy of the brain.  There is low  attenuation in the white matter of the cerebral hemispheres consistent  with sequelae of small vessel ischemic disease. There is no evidence  of intracranial hemorrhage, mass, acute infarct or anomaly.     The visualized portions of the sinuses and mastoids appear normal.  There is no evidence of trauma.      Impression    IMPRESSION:   1. No acute abnormality.  2.  Atrophy of the brain.  White matter changes consistent with  sequelae of small vessel ischemic disease.      CONTRERAS REYES MD   XR Chest 2 Views    Narrative    CHEST TWO VIEWS  1/24/2017 7:51 PM     HISTORY: Weakness.    COMPARISON: None.      Impression    IMPRESSION: Cardiomegaly. Lungs are clear. Normal pulmonary  vascularity. No pleural effusions. Previous repair of right proximal  humeral fracture with plate and  screws.    CONTRERAS REYES MD   US Abdomen Limited    Narrative    US ABDOMEN LIMITED  1/24/2017 11:13 PM      HISTORY: Right upper quadrant pain.     COMPARISON: None.    FINDINGS: The liver is normal in size and texture without focal mass.  There is no intra or extrahepatic biliary dilatation. The common  hepatic duct measures 0.6 cm. There are stones and sludge in the  gallbladder. The gallbladder wall is thickened to 0.8 cm. No  pericholecystic fluid. The pancreas head appears normal. The body and  tail are obscured by bowel gas. The right kidney measures 10.8 cm and  is normal in appearance. The proximal abdominal aorta and IVC appear  normal.       Impression    IMPRESSION:  1. Stones and sludge in the gallbladder as well as gallbladder wall  thickening. This may be acute cholecystitis.  2. No biliary dilatation.    RONAN MAGUIRE MD   CT Abdomen Pelvis w Contrast    Narrative    CT ABDOMEN PELVIS W CONTRAST  1/24/2017 11:56 PM      HISTORY: Right upper quadrant pain.    TECHNIQUE: CT abdomen and pelvis with intravenous contrast. Radiation  dose for this scan was reduced using automated exposure control,  adjustment of the mA and/or kV according to patient size, or iterative  reconstruction technique. 71 mL Isovue-370.     COMPARISON: None.    FINDINGS:    Abdomen: The lung bases are unremarkable. There are coronary artery  atherosclerotic calcifications. The heart is at the upper limits of  normal in size. The liver and spleen are normal in appearance. There  are multiple gallstones in the gallbladder. The gallbladder wall is  thickened and there is surrounding inflammation consistent with acute  cholecystitis. The pancreas is fatty replaced. There is nonspecific  thickening of the left adrenal gland. Right adrenal gland is normal.  Tiny cyst in the upper pole of the left kidney. There is no abdominal  or pelvic lymph node enlargement. There is extensive atherosclerotic  calcification of the aorta and its  branches. No aneurysm.    Pelvis: There are colonic diverticula without acute diverticulitis.  Short segment of the hepatic flexure of colon appears mildly  thick-walled though this is probably secondarily inflamed from the  adjacent gallbladder. The appendix is normal. No free intraperitoneal  gas or fluid. Multiple pelvic phleboliths. The uterus is absent. No  adnexal mass. Degenerative disease in the spine.      Impression    IMPRESSION:  1. Cholelithiasis, gallbladder wall thickening, and significant  surrounding inflammation consistent with acute cholecystitis.  2. Colonic diverticula without acute diverticulitis. No bowel  obstruction. A short segment of hepatic flexure of colon is  secondarily inflamed by the adjacent gallbladder.  3. Coronary artery atherosclerotic calcifications.    RONAN MAGUIRE MD       This note was created using voice recognition software. Undetected word substitutions or other errors may have occurred.     Time spent with the patient, reviewing the EMR, reviewing laboratory and imaging studies, more than 50% of which was counseling and coordinating care:  40 minutes.     Patricia Mckeon MD

## 2017-01-25 NOTE — H&P
PRIMARY CARE PROVIDER:  Lemuel Riggs DO, [facility].      CHIEF COMPLAINT:  Generalized weakness.      HISTORY OF PRESENT ILLNESS:  Ms. Pamela Garcia is a 89-year-old female who is quite hard of hearing, who is originally from [place], [place], visiting her daughter for the month of January, who presents to the emergency room with concerns of acute onset of generalized weakness.  The patient is fairly hard of hearing, so her daughter provides much of the history.  Unfortunately, no records are available at this time, as her care is through the [facility].  According to the family, she is a fairly active 89-year-old.  Her main history consists of MI, and most recently she underwent five-stent placement for her MI in 11/2016.  She is currently on dual antiplatelet therapy with baby aspirin and Plavix, as well as other routine medications including beta blockers and statin.  She has been doing fairly well since her stent placement with no complaints of recurrent chest pain, shortness of breath or any other concerns.  According to her daughter, she has been in her normal state of health.  She has been eating and drinking okay.  No complaints of any pain, no URI, no urinary symptoms.  Today, when one of them came home around 2:00 p.m., she was quite weak.  They could not get her out of bed, when she normally has no problems doing so.  Apparently, she also fell when trying to get out of bed; hence, EMS was called.      Upon arrival to the emergency room, she was found to be febrile with a temperature of 101.6.  Blood pressure had been stable, but dipped down to the upper 90s systolic, and improved to the 1-teens after fluids.  She was initially tachycardic in the low 100s, currently now at 90.  Other evaluation included fairly unremarkable BMP.  However, the liver enzymes were elevated with a total bilirubin of 2.0; and her alkaline phosphatase, AST, ALT were all around 500.  Initial troponin was negative at  0.025.  Her lactic acid was elevated at 3.1.  CBC showed a white count of 14.6 thousand, with a hemoglobin of 10.2, with a left shift.  Initial infectious workup includes a urinalysis that was unremarkable.  Blood cultures x2 were obtained and also unremarkable.  Rapid Strep was performed that was negative, as well as a negative influenza.      Chest x-ray performed showed mild cardiomegaly, but otherwise no pleural effusion, no infiltrate.      Due to complaints of some mild confusion, weakness, as well as the recent fall at home, CT scan of the head was performed that showed atrophy of the brain, but otherwise nothing acute.      EKG performed shows sinus tachycardia with a heart rate of 106 with premature supraventricular complexes, possible old inferior infarct, but otherwise nothing acute.      She was given a total of 1 liter of LR bolus, with some improvement in her blood pressure.  She was also started on Zosyn for presumed intraabdominal infection, namely possible cholangitis.  She is currently undergoing ultrasound of the abdomen as well as CT scan of the belly for further evaluation, and is recommended for admission to the hospital for evaluation and workup.      PAST MEDICAL HISTORY:   1.  History of myocardial infarctions, initially when she was in her mid-60s, and she underwent angioplasty.  Subsequently, she had another episode of myocardial infarction in 05/2010, in which she underwent one stent placement.  She was placed on full-dose aspirin as well as Plavix, and she ultimately ended up with a GI bleed two months later.  I believe she underwent endoscopy.  Family is not sure whether she had a colonoscopy at the same time or prior, but she did note that she had a colonoscopy that showed evidence of polyps.  Most recently she had a myocardial infarction which ultimately resulted in 5-stent placement in 11/2016.  Again, this was done at [place].  Note that her previous Plavix back in 2010 was  discontinued after the GI bleed.  She was maintained on baby aspirin.  She is now currently on dual platelet therapy again since 11/2016.  Records are not available in terms of the types of stents used, but they will be requested from [facility].   2.  History of arthritis.   3.  No history of pulmonary issues.  No previous history of gallbladder issues.   4.  History of hypothyroidism.      PAST SURGICAL HISTORY:   1.  Angioplasty when she was in her 60s.   2.  One-stent placement in 2010.   3.  Five-stent placement in 11/2016.   4.  Hysterectomy in the '60s.   5.  Surgery for pelvic prolapse.   6.  No orthopedic surgeries.      FAMILY HISTORY:  Reviewed and noncontributory to this admission.      SOCIAL HISTORY:  The patient is a .  She normally lives in [place] and is quite independent.  She does not use any walking or assistive device.  She is currently visiting one of her daughters here in the [place] for the month of January.  She is a nonsmoker, nonalcohol user.      REVIEW OF SYSTEMS:  Fairly unremarkable.  She denies any chest pain or shortness of breath, no nausea or vomiting, no complaints of diarrhea or palpitation, no change in urinary or bowel habits.  She had been asymptomatic prior to this.  She has not had any weight loss.  No new foods or travels.  Otherwise, 12-point system reviewed and all are negative beyond those stated in the HPI.      PHYSICAL EXAM:   VITAL SIGNS:  T-max of 101.2 with a heart rate of 90, blood pressure 113/50, respirations 20, saturating 95% on room air.   GENERAL APPEARANCE:  The patient is awake, alert.  She is oriented.  She is nontoxic appearing.  She is very hard of hearing.   HEENT:  Pupils are equal, round, reactive to light.  Extraocular movements are intact.  Sclerae nonicteric.  Conjunctivae pink.  Oral mucosa is pink and moist.   NECK:  Supple with no cervical lymphadenopathy or thyromegaly.  Trachea is midline.   CARDIAC:  Regular rate and rhythm, normal  S1-S2, with no significant loud murmurs appreciated.   PULMONARY:  Decreased breath sounds at the bases, otherwise clear to auscultation bilaterally.  No wheezing, rales or rhonchi.  No use of accessory muscles, no intercostal retraction.   ABDOMEN:  Bowel sounds are present.  Soft.  She does express some tenderness to palpation over the right upper quadrant.  No epigastric tenderness.  She also does endorse some lower pelvic abdominal tenderness as well, but no rebound, guarding, or peritoneal sign.   EXTREMITIES:  No clubbing, cyanosis or edema.   NEUROLOGIC:  Cranial nerves II-XII are intact.  She has bilateral symmetric upper and lower extremity strength.  She moves all of her extremities.  She has no focal deficits.   PSYCHIATRIC:  Mood and affect are appropriate.  She can answer questions appropriately, although she is quite hard of hearing.      LABORATORY RESULTS:  Again, most notable for abnormal LFTs with a total bilirubin of 2.0, alkaline phosphatase 561 with ALT of 360 and AST of 556.  Total bilirubin was slightly elevated at 2.0.  Lactic acid was initially elevated at 3.1, and on repeat is 2.3.  Lipase is low at 34.  Troponin is 0.025.  Blood glucose is 207.  On initial arrival, white count was 14.6 thousand with hemoglobin of 10.2, and platelet count of 324,000.  I am not sure what her baseline hemoglobin is, and again, records will be requested from [facility].  Again, blood cultures x2 have been obtained.  She has a negative urinalysis, negative Rapid Strep screen, and negative influenza screen.  Chest x-ray, EKG and CT of the head are all unremarkable at this time.      ASSESSMENT AND PLAN:  Ms. Pamela Garcia is a 89-year-old female with a past medical history significant for coronary artery disease, most recently status post 5-stent placement at [facility] in 11/2016.  She is currently on dual antiplatelet therapy.  She presents to the emergency room with acute onset of generalized weakness  without any preceding or constitutional symptoms.  Currently, the patient really does not have any complaints.  She is unaware that she is febrile.  She is not really complaining of any pain except upon my examination of her abdomen.  She is febrile with a T-max of 101, with elevated white count and elevated LFTs.  Working diagnosis is broad, including potential cholecystitis, potential cholangitis, versus possible intraabdominal causes for her fevers and white count.  She will be admitted to the hospital under s inpatient status for further evaluation and workup.   1.  Acute febrile illness:  Broad differential diagnosis.  So far, we have ruled out pneumonia with negative chest x-ray; urinalysis appeared to be clear; Rapid Strep and influenza all unremarkable.  With her elevated liver function tests and white count, must consider acute cholecystitis, potential cholangitis, versus other intraabdominal causes.  No obvious lesions on her skin to suggest possible cellulitis or other causes of infection.  She has not had any recent travels or food to suggest acute hepatitis, although that is still in the differential, pending CT scan of the abdomen and ultrasound results.  At this time, we will treat her empirically with IV antibiotics.  We will continue the Zosyn that was started in the emergency room.  She will be supported with IV fluids with normal saline at 125 cc an hour for the next 8 hours, and then decrease down to 100 cc an hour.  Currently, ultrasound of the abdomen as well as CT scan of the abdomen and pelvis are obtained and currently pending, so further directions as to her plan of care are pending those results.  We will also repeat liver function tests in the morning, as well as her other labs.   2.  Lactic acidosis:  Improved after IV fluids.  Obviously, with concerns for potential infection, she will be monitored closely.  Her blood pressure was a little soft earlier, but has improved with IV fluids.    3.  Leukocytosis:  This will be followed.  Antibiotics have been started for presumed possible acute cholecystitis or cholangitis versus intraabdominal process for now.   4.  Coronary artery disease:  She currently has no anginal symptoms, and cardiac-wise has been fairly stable since her stent placement.  I have kept her on baby aspirin and Plavix for now, given that her stent placement and myocardial infarction were just in 2016.  We will obtain records from [facility] to see exactly what kind of stent was placed.  Should something reveal itself that would need potential surgical evaluation, we will likely need to discuss her cardiac status with her [place] cardiologist, and/or consult Cardiology team here to help us with her cardiac risk with any potential intervention.   5.  Generalized weakness:  This is new for her.  According to the family, she is usually quite spry and active.  This is likely related to #1.  I have requested PT and OT to see her while she is here in the hospital.  I have also requested Social Work evaluation to help us with discharge needs.   6.  Hypertension:  Blood pressure currently is stable.  I have resumed her usual home medications including her Imdur and metoprolol with parameters.   7.  Hypothyroidism:  Resume Synthroid.   8.  Hyperlipidemia:  Resume statin.   9.  This patient will be admitted under inpatient status.   10.  CODE STATUS:  I discussed this with the patient as well as her family.  She requests to be DNI, but is amenable for cardiac resuscitation.         JOAN NELSON MD       As dictated by LAUREN POOLE            D: 2017 23:20   T: 2017 01:00   MT: TREVOR#[id number]      Name:     CODIE ROWE   MRN:      [id number]        Account:      [id number]   :      1925           Admitted:     [id number]      Document: [id number]       cc: Lemuel Riggs DO

## 2017-01-25 NOTE — PROGRESS NOTES
Patient seen and examined, discussed w/ LAUREN Ames.  Please see her H/P for full details.  I agree w/ her initial plan of care.  Labs/medications/imaging/vitals all reviewed.    In brief, Ms. Garcia is a pleasant 89 y.o. Woman w/ fairly recent cardiac stents placed at [facility] on [address] visiting from out of town who developed fever, AMS, weakness and abdominal pain who presented to the ER and was found to have abnormal LFTs and early sepsis.  She was appropriately treated with IV antibiotics and fluids.  I did see her following initial resuscitation and she now appears much brighter, is joking and per her family returning to her baseline.  Her abdominal exam is significant for moderate RUQ ttp.  US shows stones/sludge and GB wall thickening but normal CBD.  CT scan was subsequently obtained and preliminarily is c/w cholecystitis as well.    Key management decisions made by me: given fever, abnormal LFTs, AMS, RUQ abd pain certainly she has cholangitis but has responded very well to initial treatments and now does not appear toxic and hemodynamics/lactic acid are normalizing.  Her family favors conservative management in the short term, particularly given improvement.  We'll continue IV abx, fluids and supportive cares but certainly its reasonable to have her seen by surgery to discuss options.  Thankfully, there doesn't seem to be CBD obstruction as a proximal cause of her cholangitis/cholecystitis so I doubt ERCP would be of benefit at this time.  Reassess pending repeat LFTs/lactic acid/WBC.    Balaji Linn MD  01/25/2017  12:35 AM

## 2017-01-25 NOTE — PLAN OF CARE
Problem: Goal Outcome Summary  Goal: Goal Outcome Summary  OT- eval completed and treatment initiated. Patient typically resides in [facility] alone in [place]. Currently, she is staying with her daughter for the month of January. Daughter lives in a split entry home- 1 step in from garage and patient thinks 10 steps up to upper level. Typically, she stays on upper level when daughter is at work. Patient reports her other daughter could come stay with her when daughter she is staying with is at work. Unsure if this is accurate. At baseline, patient reports she is I with basic ADL's and IADL's, has A with housecleaning 2x/month, but sh was driving, managing cooking, laundry as well as errands.     Currently, patient is mod I with bed mobility with use of bed rails for support with HOB elevated slightly. CGA with functional mobility while using IV pole or furniture in room for support due to unsteadiness. Patient was CGA with toileting skills, including undergarments, however declined pericares despite toileting and cues. Cues also needed to wash hands after toileting, but was CGA to complete sinkside cares, including oral cares and hand washing. Ambulated in palmer for over 30 feet and transferred to chair with CGA and IV pole.     Will continue with OT as patient is not at baseline for ADL's and IADL's, recommend home with 24 hour supervision and support, recommend home therapy to increase safety with functional ADL's, mobility and increase strength and activity tolerance.

## 2017-01-25 NOTE — CONSULTS
GASTROENTEROLOGY CONSULTATION      Pamela Garcia  [address] [facility] [id number]  89 year old female     Admission Date/Time: 1/24/2017  Primary Care Provider: Lemuel Riggs     We were asked to see the patient in consultation by Dr. Delgadillo for evaluation of elevated liver enzymes, cholecystitis.    CC: weakness     HPI:  Pamela Garcia is a 89 year old female with past medical history significant for MI s/p cardiac stent x 5 11/2016 currently on ASA and Plavix presenting to the hospital yesterday with profound weakness. Patient resides independently in [place], [place] and had noted worsening fatigue/weakness over the last week. Yesterday, she was unable to get out of bed secondary to profound weakness therefore her daughter brought her to the ER. Workup has shown leukocytosis, elevated liver enzymes, hyperbilirubinemia. Abd US was positive for gall stones/sludge along with gall bladder wall thickening consistent with cholecystitis, without biliary duct dilation. CT A/P also showed gall stones and gall bladder wall thickening with reactionary colitis in the hepatic flexure. Blood cultures are positive for gram negative rods. Patient was noted to be febrile in the ER with reported temp of 101F. She has been started on IV Zosyn and general surgery has been consulted. Patient denies any history of fevers, chills, abdominal pain, nausea, vomiting, jaundice, scleral icterus, dark urine, or light colored stools.     Total bilirubin noted to be 2-->2.3, Alk phos 561-->414, -->287, -->343.  Direct bilirubin 1-->1.7. WBC 14.6-->16.1. HGB 10.2-->8.5, platelets 273. INR 1.16. Creatinine normal.        PAST MEDICAL HISTORY:  Patient Active Problem List    Diagnosis Date Noted     Febrile illness, acute 01/25/2017     Priority: Medium          ROS: A comprehensive ten point review of systems was negative aside from those in mentioned in the HPI.       MEDICATIONS:   ASA  Plavix  "  Levothyroxine  Atorvastatin  Nitroglycerin  Isosorbide  Calcium/Vitamin D  Metoprolol  Protonix     ALLERGIES: No Known Allergies     SOCIAL HISTORY:  Social History   Substance Use Topics     Smoking status: Not on file     Smokeless tobacco: Not on file     Alcohol Use: Not on file        FAMILY HISTORY:  Reviewed, noncontributory.      PHYSICAL EXAM:   /56 mmHg  Pulse 106  Temp(Src) 98.4  F (36.9  C) (Oral)  Resp 18  Ht 1.448 m (4' 9\")  Wt 63.504 kg (140 lb)  BMI 30.29 kg/m2  SpO2 94%     PHYSICAL EXAM:  General: alert, oriented, NAD  SKIN: no suspicious lesions, rashes, jaundice, or spider angiomas  HEAD: Normocephalic. No masses, lesions, tenderness or abnormalities  NECK: Neck supple. No adenopathy. Thyroid symmetric, normal size.  EYES: No scleral icterus  ENT: ENT exam normal, no neck nodes or sinus tenderness  RESPIRATORY: negative, Good diaphragmatic excursion. Lungs clear  CARDIOVASCULAR: negative, PMI normal. No lifts, heaves, or thrills. RRR. No murmurs, clicks gallops or rub  GASTROINTESTINAL: +BS, soft, +generalized tenderness, more localized tenderness noted RUQ, ND, no HSM, no masses/guarding/rebound  JOINT/EXTREMITIES: extremities normal- no gross deformities noted, gait normal and normal muscle tone  NEURO: Reflexes grossly normal and symmetric. Sensation grossly WNL.  PSYCH: no abnormal anxiety/depression  LYMPH: No anterior cervical, posterior cervical, or supraclavicular adenopathy     LABS:  I reviewed the patient's new clinical lab test results.   Recent Labs   Lab Test  01/25/17 0620 01/24/17   1813   WBC  16.1*  14.6*   HGB  8.5*  10.2*   MCV  80  79   PLT  273  324   INR   --   1.16*     Recent Labs   Lab Test  01/25/17 0620  01/24/17   1813   NA  141  135   POTASSIUM  3.6  4.2   CHLORIDE  105  98   CO2  26  26   BUN  17  22   ANIONGAP  10  11   JORDAN  8.1*  8.9     Recent Labs   Lab Test  01/25/17   0620  01/24/17   2210  01/24/17   1911  01/24/17   1813   ALBUMIN  2.1*  "  --    --   2.5*   BILITOTAL  2.3*   --    --   2.0*   ALT  287*   --    --   360*   AST  343*   --    --   556*   ALKPHOS  414*   --    --   561*   PROTEIN   --    --   10*   --    LIPASE   --   34*   --    --         IMAGING  I personally reviewed the patient's new imaging results.    US ABDOMEN LIMITED  1/24/2017 11:13 PM        HISTORY: Right upper quadrant pain.       COMPARISON: None.     FINDINGS: The liver is normal in size and texture without focal mass.  There is no intra or extrahepatic biliary dilatation. The common  hepatic duct measures 0.6 cm. There are stones and sludge in the  gallbladder. The gallbladder wall is thickened to 0.8 cm. No  pericholecystic fluid. The pancreas head appears normal. The body and  tail are obscured by bowel gas. The right kidney measures 10.8 cm and  is normal in appearance. The proximal abdominal aorta and IVC appear  normal.                                                                         IMPRESSION:  1. Stones and sludge in the gallbladder as well as gallbladder wall  thickening. This may be acute cholecystitis.  2. No biliary dilatation.       CT ABDOMEN PELVIS W IV CONTRAST  1/24/2017 11:56 PM       FINDINGS:  Abdomen: The lung bases are unremarkable. There are coronary artery  atherosclerotic calcifications. The heart is at the upper limits of  normal in size. The liver and spleen are normal in appearance. There  are multiple gallstones in the gallbladder. The gallbladder wall is  thickened and there is surrounding inflammation consistent with acute  cholecystitis. The pancreas is fatty replaced. There is nonspecific  thickening of the left adrenal gland. Right adrenal gland is normal.  Tiny cyst in the upper pole of the left kidney. There is no abdominal  or pelvic lymph node enlargement. There is extensive atherosclerotic  calcification of the aorta and its branches. No aneurysm.     Pelvis: There are colonic diverticula without acute diverticulitis.  Short  segment of the hepatic flexure of colon appears mildly  thick-walled though this is probably secondarily inflamed from the  adjacent gallbladder. The appendix is normal. No free intraperitoneal  gas or fluid. Multiple pelvic phleboliths. The uterus is absent. No  adnexal mass. Degenerative disease in the spine.                                                                    IMPRESSION:  1. Cholelithiasis, gallbladder wall thickening, and significant  surrounding inflammation consistent with acute cholecystitis.  2. Colonic diverticula without acute diverticulitis. No bowel  obstruction. A short segment of hepatic flexure of colon is  secondarily inflamed by the adjacent gallbladder.  3. Coronary artery atherosclerotic calcifications.     CONSULTATION ASSESSMENT AND PLAN:    89 year old female presenting with week long history of weakness, acutely worsening yesterday prompting evaluation in the ER. Workup consistent with acute cholecystitis on imaging and labwork with associated bacteremia. There is no evidence of bile duct dilation therefore choledocholithiasis less likely despite elevations in alk phos and bilirubin. With noted RUQ pain, leukocytosis, fever, along with gram negative rods on blood culture; also a concern for cholangitis. Complicating factors include recent MI 11/2016 with cardiac stent x5 requiring use of Plavix/ASA. Surgery has been consulted and recommend cholecystostomy tube. I feel this is likely the best approach in an elderly patient with cardiac risk factors and drainage of gall bladder very well could avoid need for ERCP. Patient would be high risk for bleeding if sphincterotomy required. At this point, IR has been consulted although are holding on procedure due to antiplatelet therapy. Keep patient NPO for now. Continue IV Zosyn.     Will discuss further with biliary team and touch base with Dr. Mckeon (). Will review patient with Dr. Figueroa, who will see patient later today.    Thank you  for asking us to participate in the care of this patient.    Eleanor Waldrop PA-C  [facility]

## 2017-01-25 NOTE — ED PROVIDER NOTES
"  History     Chief Complaint:  Fall and Generalized Weakness    History provided by the patient's daughters secondary to the patient's condition.   HPI   Pamela Garcia is a 89 year old female who presents to the emergency department today via EMS for evaluation of a fall and generalized weakness. The patient is currently visiting from [place], [place] and living with her daughter. Per her daughter's, the patient has been having a hard time getting off the couch and walking. The daughter left the house at 0630 this morning and the patient was alone throughout the day, although this is a normal routine. She returned to the house around 1400 and found the patient on the floor near the bed with a presumed fall out of bed. She could not lift the patient back up, therefore EMS was called and the patient was brought to the ED for further evaluation. Per EMS, the patient had two episodes of confusion and trouble talking which is different per daughters. Upon presentation, the family notes the patient has \"strong smelling urine\" as well as shaking a little bit. They believe the patient may not have eaten lunch today either. The patient denies pain anywhere specifically headache, chest pain, abd pain, nausea, cold like symptoms, cough, headache, numbness, visual changes, and neck or back pain. Of note, the patient normally gets her care at [facility].     Allergies:  No Known Drug Allergies     Medications:    Aspirin  Atorvastatin  Caltrate  Centrum  Plavix  Isosorbide  Levothyroxine  Metoprolol  Protonix  Nitroglycerin    Past Medical History:    Myocardial infarction    GI bleed  Arthritis     Past Surgical History:    Cardiac stent placement  Gyn surgery    Family History:    History reviewed. No pertinent family history.     Social History:  The patient was accompanied to the ED by her daughters.    Review of Systems   Constitutional: Positive for fatigue. Negative for fever.   HENT: Negative for congestion, sneezing and " "sore throat.    Eyes: Negative for visual disturbance.   Respiratory: Negative for cough.    Genitourinary:        \"strong smelling urine\"   Musculoskeletal: Negative for neck pain and neck stiffness.   Neurological: Positive for tremors and weakness. Negative for headaches.   Psychiatric/Behavioral: Positive for confusion.   All other systems reviewed and are negative.    Physical Exam   First Vitals:  BP: 162/65 mmHg  Pulse: 106  Heart Rate: 106  Temp: 98.3  F (36.8  C)  Resp: 20  Height: 144.8 cm (4' 9\")  Weight: 63.504 kg (140 lb)  SpO2: 95 %      Physical Exam  VITAL SIGNS: /65 mmHg  Pulse 106  Temp(Src) 98.3  F (36.8  C) (Oral)  Resp 20  Ht 1.448 m (4' 9\")  Wt 63.504 kg (140 lb)  BMI 30.29 kg/m2  SpO2 95%   Constitutional:  Frail elderly female, no acute distress.   HENT:  Bilateral external ears normal, Mucous membranes dry, Nose normal. Neck- Normal range of motion, Supple. Hard of hearing.   Respiratory:  Normal breath sounds, No respiratory distress, No wheezing,  Cardiovascular:  Mild tachycardic, Normal rhythm, No murmurs,    GI:  Bowel sounds normal, Soft, No tenderness,   Musculoskeletal:  Intact distal pulses, No edema, grossly unremarkable range of motion. No c-spine tenderness.   Integument:  Warm, Dry   Neurologic:  Alert, attentive and appropriately oriented. Oriented to self, year, and place. Cranial nerves II-XII intact. Somewhat halting speech. General confusion but no expressive asphasia per say. Hand , ankle planter dorsal flexions, and light touch sensations in all extremities are intact.   Psychiatric:  Mood and affect normal.     Emergency Department Course     ECG:  ECG taken at 1816, ECG read at 1838  Sinus tachycardia  Q waves in III, avF  Abnormal ECG  Rate 108 bpm. MN interval 120. QRS duration 80. QT/QTc 340/455. P-R-T axes 81 18 58.    Imaging:  Radiology findings were communicated with the patient and family who voiced understanding of the findings.  XR Chest 2 " Views  IMPRESSION: Cardiomegaly. Lungs are clear. Normal pulmonary  vascularity. No pleural effusions. Previous repair of right proximal  humeral fracture with plate and screws.  Final reading per radiology    CT Head w/o Contrast  IMPRESSION:   1. No acute abnormality.  2.  Atrophy of the brain.  White matter changes consistent with  sequelae of small vessel ischemic disease.  Report per radiology    US Abdomen Limited  Pending    CT Abdomen Pelvis w Contrast  Pending    Laboratory:  Laboratory findings were communicated with the patient and family who voiced understanding of the findings.  CBC: WBC 14.6(H), HGB 10.2(L) o/w WNL. ()   BMP: Glucose 207(H) o/w WNL (Creatinine 0.63)  Troponin (Collected 1812): 0.025  Lactic Acid: 3.1(H)  UA: Protein albumin 10, mucous present o/w WNL  Influenza: Negative  Rapid strep screen: Negative  Lactic Acid: 2.3(H)  Lipase: 34(L)    Blood Cultures: Pending  Beta Strep Group A Culture: Pending     Interventions:  1837 NS 1,000mL IV  2008 NS 1,000mL IV  2008 Tylenol 650mg PO  2008 Lactated Ringers   Zosyn 3.375g IV     Emergency Department Course:  Nursing notes and vitals reviewed.  I performed an exam of the patient as documented above.   The patient was sent for a XR Chest 2 Views, CT Head w/o Contrast, and US Abdomen Limited while in the emergency department, results above.   IV was inserted and blood was drawn for laboratory testing, results above.  The patient provided a urine sample here in the emergency department. This was sent for laboratory testing, findings above.  1819: I initially examined the patient.   2020: Patient rechecked and updated on the imaging and laboratory results. She states she feels better, and is talking more like herself per daughters.   2113:  I spoke with  Terri Mac PA-C of the hospitalist service regarding patient's presentation, findings, and plan of care.  2200: I consulted with Terri Mac PA-C regarding her hepatic panel results.  She is aware of the soft blood pressures and still agreed to admit the patient.   11:40 PM LAUREN Mac has followed results; BP has been improved. Pt stable and accepted for admission to the hospital.   I discussed the treatment plan with the patient. They expressed understanding of this plan and consented to admission. I discussed the patient with Terri Mac PA-C, who will admit the patient to a monitored bed for further evaluation and treatment.  I personally reviewed the laboratory and imaging results with the Patient and daughter and answered all related questions prior to admission.      Impression & Plan      Medical Decision Making:  Pamela Garcia is a 89 year old female brought in my family for evaluation of some generalized weakness, minimal respiratory symptoms, and a fall from bed today. She was initially afebrile here with stable vital signs, and again denied any specific symptoms. Workup here was notable for a mild leukocytosis and elevated lactic acid, and the developed of a low grade fever. Source of fever was unclear, as UA did not suggest infections. Chest x-ray was unremarkable. CT showed no sequale of trauma. On examination, he stated she was feeling better, but given the picture of some weakness with an unknown source of fever, I did recommend admission for further evaluation. She and her family had agreed with plan. The hospitalist then ordered an added on hepatic profile which was elevated and clinical course evolved from there. Given the elevation, I ordered antibiotics. Her blood pressure had a decrease prior to her 30 kg/cc bolus, down to the 90's, but was stable and then improved. She was admitted to the hospitalist for further evaluation. Does have an ultrasound and CT of the abdomen pending.     Diagnosis:    ICD-10-CM    1. Fever, unspecified R50.9    2. Generalized muscle weakness M62.81      Disposition:   Admitted under the supervision of Terri Mac PA-C    Scribe Disclosure:  I  Honey Pal, am serving as a scribe at 6:19 PM on 1/24/2017 to document services personally performed by Gilma Julien MD, based on my observations and the provider's statements to me.    1/24/2017   [facility] EMERGENCY DEPARTMENT        Gilma Julien MD  01/24/17 [id number]

## 2017-01-25 NOTE — PLAN OF CARE
Problem: Goal Outcome Summary  Goal: Goal Outcome Summary  Outcome: No Change  No fever since arriving of the floor.  Denies having any pain.  Lactic acid on recheck was 1.6.  Tele is SR.  Up with assist of 2 to the bathroom.  Plan is to continue Zosyn.  Surgery consult today.  NPO with ice chips tonight.  Continue with POC.

## 2017-01-25 NOTE — PHARMACY-ADMISSION MEDICATION HISTORY
Admission medication history interview status for this patient is complete. See [facility] admission navigator for allergy information, prior to admission medications and immunization status.     Medication history interview source(s):Family  Medication history resources (including written lists, pill bottles, clinic record):Medication List  Primary pharmacy:[facility], [address]    Changes made to PTA medication list:  Added(0): None  Deleted(0): None  Changed(1): Metoprolol tartrate(once to twice daily)    Actions taken by pharmacist (provider contacted, etc):None     Additional medication history information:  -Did not take any medications today, 1/24/17    Medication reconciliation/reorder completed by provider prior to medication history? No    For patients on insulin therapy: No (Yes/No)  Lantus/levemir/NPH/Mix 70/30 dose:  _____   in AM/PM  or twice daily   Sliding scale Novolog Y/N  If Yes, do you have a baseline novolog pre-meal dose:  ______units with meals   Patients eat three meals a day:   Y/N     Any Barriers to therapy:  cost of medications/comfortable with giving injections (if applicable)/ comfortable and confident with current diabetes regimen       Prior to Admission medications    Medication Sig Last Dose Taking? Auth Provider   calcium-vitamin D (CALTRATE) 600-400 MG-UNIT per tablet Take 1 tablet by mouth 2 times daily  Yes Reported, Patient   Multiple Vitamins-Minerals (CENTRUM SILVER) per tablet Take 1 tablet by mouth daily  Yes Reported, Patient   Nitroglycerin (NITROTAB SL) Place 0.4 mg under the tongue  Yes Reported, Patient   aspirin 81 MG tablet Take 81 mg by mouth daily 1/23/2017 at AM Yes Unknown, Entered By History   atorvastatin (LIPITOR) 40 MG tablet Take 40 mg by mouth daily 1/23/2017 at Bedtime Yes Unknown, Entered By History   clopidogrel (PLAVIX) 75 MG tablet Take 75 mg by mouth daily 1/23/2017 at AM Yes Unknown, Entered By History   isosorbide mononitrate (IMDUR) 60 MG 24 hr  tablet Take 60 mg by mouth daily 1/23/2017 at AM Yes Unknown, Entered By History   levothyroxine (SYNTHROID/LEVOTHROID) 75 MCG tablet Take 75 mcg by mouth daily 1/23/2017 at AM Yes Unknown, Entered By History   metoprolol (LOPRESSOR) 25 MG tablet Take 25 mg by mouth 2 times daily 1/23/2017 at PM Yes Unknown, Entered By History   pantoprazole (PROTONIX) 20 MG EC tablet Take 20 mg by mouth daily 1/23/2017 at AM Yes Unknown, Entered By History

## 2017-01-25 NOTE — ED NOTES
Bed: ED14  Expected date: 1/24/17  Expected time: 6:01 PM  Means of arrival: Ambulance  Comments:  RADHA

## 2017-01-25 NOTE — PROCEDURES
RADIOLOGY PROCEDURE NOTE  Patient name: Pamela Garcia  MRN: [id number]  : 1925    Pre-procedure diagnosis: Cholecystitis  Post-procedure diagnosis: Same    Procedure Date/Time: 2017  3:23 PM  Procedure: US and fluoro guided placement of 8 Fr cholecystostomy tube.  Only able to obtain 1-2 ml of bloody fluid removed.  No additional fluid to remove.  Cystic duct patent, as is CBD.  No intrahepatic biliary dilatation.  Estimated blood loss: < 5 ml  Specimen(s) collected with description: Tiny sample of fluid.  The patient tolerated the procedure well with no immediate complications.  Significant findings:  Please see above for details.    See imaging dictation for procedural details.    Provider name: Willard Romeo  Assistant(s):None

## 2017-01-25 NOTE — PROGRESS NOTES
Pt returned from radiology at 15:30. Pt has no c/o of pain, bilary drain to right abd (to gravity) drainage amount 2cc in tubing. Dressing CDI, no hematoma. VSS. Will cont to monitor

## 2017-01-25 NOTE — H&P
89 yo with hx of CAD s/p recent MI in 11/16 with 5 stent placed at [facility]. On dual anti-plt therapy, BB, statin.   Here from [facility] visiting dtr for the month of Jan. Was doing well till today with main complaint as profound weakness, can't get OOB (normally ambulatory w/o any assistive device).     ED work up shows fever of 101, elevated WBC, LA and lfts. Other infectious etiology ruled out (neg CXR, Neg UA, Neg Influenza,rapid strep).   Prelim US shows stones and sludge in the GB with GB wall thickening suspicious for acute chris. No intra and extra hepatic biliary duct, CBD 0.6 cm so less likely CBD stone obstruction.   CT a/p still pending.     A/P:  Admit as inpatient for suspected acute cholecystitis can not r/o cholangitis/ CBD stone obstruction but less likely given normal CBD size and minimal Tbili elevation. But will need to follow labs. CT a/p pending.   -Cont IV abx with Zosyn. NPO, IVF, surgical consult. Follow Hepatic, BMP and CBC in am. Lactic acid improving after IVF down to 2.3, repeat in 4 hrs (0200)  -Suspect medical management with IV abx to cool GB yobany in light of recent MI and stent placement in 11/2016 (records from [place] requested). I kept her on ASA/Plavix for now, Cont BB (with parameters) and statin for now.   -DNI only d/w pt

## 2017-01-25 NOTE — PROGRESS NOTES
[facility]  Hospitalist Progress Note  Rocky Delgadillo MD 01/25/2017    Reason for Stay (Diagnosis): sepsis         Assessment and Plan:      Summary of Stay: Pamela Garcia is a 89-year-old female with a past medical history significant for coronary artery disease, most recently status post 5-stent placement at [facility] in 11/2016.  She is currently on dual antiplatelet therapy.  She presents to the emergency room with acute onset of generalized weakness without any preceding or constitutional symptoms.  Currently, the patient really does not have any complaints.  She is unaware that she is febrile.  She is not really complaining of any pain except upon my examination of her abdomen.  She is febrile with a T-max of 101, with elevated white count and elevated LFTs.  Working diagnosis is broad, including potential cholecystitis, potential cholangitis, versus possible intraabdominal causes for her fevers and white count.  She will be admitted to the hospital under s inpatient status for further evaluation and workup.    1.  sepsis secondary to acute cholecystitis - clinically improved and not toxic appearing.  Remains on Zosyn.  Blood cx + ecoli.  Appreciate GI and surgery input - I d/w Dr. Mckeon today and she has talked to IR about placement of cholecystostomy tube given surgical risks in setting of recent MI in 11/16 and need for Plavix for drug eluting stent placement  2.  Lactic acidosis:  secondary to sepsis.  resolved    4.  Coronary artery disease:  She currently has no anginal symptoms, and cardiac-wise has been fairly stable since her stent placement.  Continue Plavix and ASA in setting of recent drug eluting stent placement to prevent in-stent thrombosis.  I talked with her dtr Angelica today who stated that CALEB were placed and she is planning on bringing in a copy of the report from the procedure documenting this  5.  Generalized weakness:  rehab consult  6.  Hypertension:  Blood  "pressure currently is stable.  resumed her usual home medications including her Imdur and metoprolol with parameters.    7.  Hypothyroidism:  Resumed Synthroid.    8.  Hyperlipidemia:  Resumed statin.      9.  Dementia  10.  CODE STATUS:  I discussed this with the patient as well as her family.  She requests to be DNI, but is amenable for cardiac resuscitation.      DVT Prophylaxis: Pneumatic Compression Devices  Code Status: DNI  Discharge Dispo: home anticipated  Estimated Disch Date / # of Days until Disch: 2-3 days or longer        Interval History (Subjective):      No complaints.  Feels. Well.  Asking when she can go home                  Physical Exam:      Last Vital Signs:  /70 mmHg  Pulse 106  Temp(Src) 98.4  F (36.9  C) (Oral)  Resp 18  Ht 1.448 m (4' 9\")  Wt 63.504 kg (140 lb)  BMI 30.29 kg/m2  SpO2 94%    No intake or output data in the 24 hours ending 01/25/17 1201    Constitutional: Awake, alert, cooperative, no apparent distress.  Sitting up in chair   Respiratory: Clear to auscultation bilaterally, no crackles or wheezing   Cardiovascular: Regular rate and rhythm, normal S1 and S2, and no murmur noted   Abdomen: Normal bowel sounds, soft, non-distended, non-tender   Skin: No rashes, no cyanosis, dry to touch   Neuro: Alert and confused c/w dementia hx, no weakness, numbness, + memory loss   Extremities: No edema, normal range of motion   Other(s):        All other systems: Negative          Medications:      All current medications were reviewed with changes reflected in problem list.         Data:      All new lab and imaging data was reviewed.   Labs:    Recent Labs  Lab 01/25/17  0620   WBC 16.1*   HGB 8.5*   HCT 27.2*   MCV 80         Imaging:   Recent Results (from the past 24 hour(s))   CT Head w/o Contrast    Narrative    CT SCAN OF THE HEAD WITHOUT CONTRAST   1/24/2017 7:35 PM     HISTORY: Head injury. Patient on Plavix.    TECHNIQUE:  Axial images of the head and coronal " reformations without  IV contrast material. Radiation dose for this scan was reduced using  automated exposure control, adjustment of the mA and/or kV according  to patient size, or iterative reconstruction technique.    COMPARISON: None.    FINDINGS:  There is generalized atrophy of the brain.  There is low  attenuation in the white matter of the cerebral hemispheres consistent  with sequelae of small vessel ischemic disease. There is no evidence  of intracranial hemorrhage, mass, acute infarct or anomaly.     The visualized portions of the sinuses and mastoids appear normal.  There is no evidence of trauma.      Impression    IMPRESSION:   1. No acute abnormality.  2.  Atrophy of the brain.  White matter changes consistent with  sequelae of small vessel ischemic disease.      CONTRERAS REYES MD   XR Chest 2 Views    Narrative    CHEST TWO VIEWS  1/24/2017 7:51 PM     HISTORY: Weakness.    COMPARISON: None.      Impression    IMPRESSION: Cardiomegaly. Lungs are clear. Normal pulmonary  vascularity. No pleural effusions. Previous repair of right proximal  humeral fracture with plate and screws.    CONTRERAS REYES MD   US Abdomen Limited    Narrative    US ABDOMEN LIMITED  1/24/2017 11:13 PM      HISTORY: Right upper quadrant pain.     COMPARISON: None.    FINDINGS: The liver is normal in size and texture without focal mass.  There is no intra or extrahepatic biliary dilatation. The common  hepatic duct measures 0.6 cm. There are stones and sludge in the  gallbladder. The gallbladder wall is thickened to 0.8 cm. No  pericholecystic fluid. The pancreas head appears normal. The body and  tail are obscured by bowel gas. The right kidney measures 10.8 cm and  is normal in appearance. The proximal abdominal aorta and IVC appear  normal.       Impression    IMPRESSION:  1. Stones and sludge in the gallbladder as well as gallbladder wall  thickening. This may be acute cholecystitis.  2. No biliary dilatation.    RONAN  MD TING   CT Abdomen Pelvis w Contrast    Narrative    CT ABDOMEN PELVIS W CONTRAST  1/24/2017 11:56 PM      HISTORY: Right upper quadrant pain.    TECHNIQUE: CT abdomen and pelvis with intravenous contrast. Radiation  dose for this scan was reduced using automated exposure control,  adjustment of the mA and/or kV according to patient size, or iterative  reconstruction technique. 71 mL Isovue-370.     COMPARISON: None.    FINDINGS:    Abdomen: The lung bases are unremarkable. There are coronary artery  atherosclerotic calcifications. The heart is at the upper limits of  normal in size. The liver and spleen are normal in appearance. There  are multiple gallstones in the gallbladder. The gallbladder wall is  thickened and there is surrounding inflammation consistent with acute  cholecystitis. The pancreas is fatty replaced. There is nonspecific  thickening of the left adrenal gland. Right adrenal gland is normal.  Tiny cyst in the upper pole of the left kidney. There is no abdominal  or pelvic lymph node enlargement. There is extensive atherosclerotic  calcification of the aorta and its branches. No aneurysm.    Pelvis: There are colonic diverticula without acute diverticulitis.  Short segment of the hepatic flexure of colon appears mildly  thick-walled though this is probably secondarily inflamed from the  adjacent gallbladder. The appendix is normal. No free intraperitoneal  gas or fluid. Multiple pelvic phleboliths. The uterus is absent. No  adnexal mass. Degenerative disease in the spine.      Impression    IMPRESSION:  1. Cholelithiasis, gallbladder wall thickening, and significant  surrounding inflammation consistent with acute cholecystitis.  2. Colonic diverticula without acute diverticulitis. No bowel  obstruction. A short segment of hepatic flexure of colon is  secondarily inflamed by the adjacent gallbladder.  3. Coronary artery atherosclerotic calcifications.    RONAN MAGUIRE MD

## 2017-01-26 ENCOUNTER — APPOINTMENT (OUTPATIENT)
Dept: PHYSICAL THERAPY | Facility: CLINIC | Age: 82
DRG: 872 | End: 2017-01-26
Payer: MEDICARE

## 2017-01-26 LAB
ALBUMIN SERPL-MCNC: 2.1 G/DL (ref 3.4–5)
ALP SERPL-CCNC: 375 U/L (ref 40–150)
ALT SERPL W P-5'-P-CCNC: 313 U/L (ref 0–50)
ANION GAP SERPL CALCULATED.3IONS-SCNC: 10 MMOL/L (ref 3–14)
AST SERPL W P-5'-P-CCNC: 370 U/L (ref 0–45)
BASOPHILS # BLD AUTO: 0 10E9/L (ref 0–0.2)
BASOPHILS NFR BLD AUTO: 0.2 %
BILIRUB DIRECT SERPL-MCNC: 1.3 MG/DL (ref 0–0.2)
BILIRUB SERPL-MCNC: 1.7 MG/DL (ref 0.2–1.3)
BUN SERPL-MCNC: 16 MG/DL (ref 7–30)
CALCIUM SERPL-MCNC: 7.6 MG/DL (ref 8.5–10.1)
CHLORIDE SERPL-SCNC: 107 MMOL/L (ref 94–109)
CO2 SERPL-SCNC: 25 MMOL/L (ref 20–32)
CREAT SERPL-MCNC: 0.68 MG/DL (ref 0.52–1.04)
DIFFERENTIAL METHOD BLD: ABNORMAL
EOSINOPHIL # BLD AUTO: 0 10E9/L (ref 0–0.7)
EOSINOPHIL NFR BLD AUTO: 0.2 %
ERYTHROCYTE [DISTWIDTH] IN BLOOD BY AUTOMATED COUNT: 17.5 % (ref 10–15)
GFR SERPL CREATININE-BSD FRML MDRD: 81 ML/MIN/1.7M2
GLUCOSE SERPL-MCNC: 126 MG/DL (ref 70–99)
HCT VFR BLD AUTO: 27.9 % (ref 35–47)
HGB BLD-MCNC: 8.8 G/DL (ref 11.7–15.7)
IMM GRANULOCYTES # BLD: 0.1 10E9/L (ref 0–0.4)
IMM GRANULOCYTES NFR BLD: 0.9 %
LYMPHOCYTES # BLD AUTO: 0.6 10E9/L (ref 0.8–5.3)
LYMPHOCYTES NFR BLD AUTO: 5.2 %
MCH RBC QN AUTO: 25.1 PG (ref 26.5–33)
MCHC RBC AUTO-ENTMCNC: 31.5 G/DL (ref 31.5–36.5)
MCV RBC AUTO: 80 FL (ref 78–100)
MONOCYTES # BLD AUTO: 0.8 10E9/L (ref 0–1.3)
MONOCYTES NFR BLD AUTO: 6.3 %
NEUTROPHILS # BLD AUTO: 10.6 10E9/L (ref 1.6–8.3)
NEUTROPHILS NFR BLD AUTO: 87.2 %
NRBC # BLD AUTO: 0 10*3/UL
NRBC BLD AUTO-RTO: 0 /100
PLATELET # BLD AUTO: 276 10E9/L (ref 150–450)
POTASSIUM SERPL-SCNC: 3.9 MMOL/L (ref 3.4–5.3)
PROT SERPL-MCNC: 6.1 G/DL (ref 6.8–8.8)
RBC # BLD AUTO: 3.5 10E12/L (ref 3.8–5.2)
SODIUM SERPL-SCNC: 142 MMOL/L (ref 133–144)
WBC # BLD AUTO: 12.1 10E9/L (ref 4–11)

## 2017-01-26 PROCEDURE — 25000128 H RX IP 250 OP 636: Performed by: INTERNAL MEDICINE

## 2017-01-26 PROCEDURE — A9270 NON-COVERED ITEM OR SERVICE: HCPCS | Mod: GY | Performed by: PHYSICIAN ASSISTANT

## 2017-01-26 PROCEDURE — A9270 NON-COVERED ITEM OR SERVICE: HCPCS | Mod: GY | Performed by: INTERNAL MEDICINE

## 2017-01-26 PROCEDURE — 25000125 ZZHC RX 250: Performed by: PHYSICIAN ASSISTANT

## 2017-01-26 PROCEDURE — 97116 GAIT TRAINING THERAPY: CPT | Mod: GP | Performed by: PHYSICAL THERAPIST

## 2017-01-26 PROCEDURE — 80053 COMPREHEN METABOLIC PANEL: CPT | Performed by: INTERNAL MEDICINE

## 2017-01-26 PROCEDURE — 25000132 ZZH RX MED GY IP 250 OP 250 PS 637: Mod: GY | Performed by: PHYSICIAN ASSISTANT

## 2017-01-26 PROCEDURE — 25000132 ZZH RX MED GY IP 250 OP 250 PS 637: Mod: GY | Performed by: INTERNAL MEDICINE

## 2017-01-26 PROCEDURE — 40000193 ZZH STATISTIC PT WARD VISIT: Performed by: PHYSICAL THERAPIST

## 2017-01-26 PROCEDURE — 97161 PT EVAL LOW COMPLEX 20 MIN: CPT | Mod: GP | Performed by: PHYSICAL THERAPIST

## 2017-01-26 PROCEDURE — 82248 BILIRUBIN DIRECT: CPT | Performed by: INTERNAL MEDICINE

## 2017-01-26 PROCEDURE — 12000007 ZZH R&B INTERMEDIATE

## 2017-01-26 PROCEDURE — 99232 SBSQ HOSP IP/OBS MODERATE 35: CPT | Performed by: INTERNAL MEDICINE

## 2017-01-26 PROCEDURE — 25000125 ZZHC RX 250: Performed by: INTERNAL MEDICINE

## 2017-01-26 PROCEDURE — 99231 SBSQ HOSP IP/OBS SF/LOW 25: CPT | Performed by: SURGERY

## 2017-01-26 PROCEDURE — 85025 COMPLETE CBC W/AUTO DIFF WBC: CPT | Performed by: INTERNAL MEDICINE

## 2017-01-26 RX ORDER — CIPROFLOXACIN 2 MG/ML
400 INJECTION, SOLUTION INTRAVENOUS EVERY 12 HOURS
Status: DISCONTINUED | OUTPATIENT
Start: 2017-01-26 | End: 2017-01-27 | Stop reason: HOSPADM

## 2017-01-26 RX ADMIN — METOPROLOL TARTRATE 25 MG: 25 TABLET, FILM COATED ORAL at 21:17

## 2017-01-26 RX ADMIN — METOPROLOL TARTRATE 25 MG: 25 TABLET, FILM COATED ORAL at 08:00

## 2017-01-26 RX ADMIN — CLOPIDOGREL 75 MG: 75 TABLET, FILM COATED ORAL at 08:00

## 2017-01-26 RX ADMIN — SODIUM CHLORIDE: 9 INJECTION, SOLUTION INTRAVENOUS at 05:44

## 2017-01-26 RX ADMIN — Medication 2.5 MG: at 21:17

## 2017-01-26 RX ADMIN — ASPIRIN 81 MG: 81 TABLET, COATED ORAL at 08:00

## 2017-01-26 RX ADMIN — TAZOBACTAM SODIUM AND PIPERACILLIN SODIUM 3.38 G: 375; 3 INJECTION, SOLUTION INTRAVENOUS at 11:20

## 2017-01-26 RX ADMIN — CIPROFLOXACIN 400 MG: 2 INJECTION, SOLUTION INTRAVENOUS at 15:09

## 2017-01-26 RX ADMIN — ATORVASTATIN CALCIUM 40 MG: 40 TABLET, FILM COATED ORAL at 08:00

## 2017-01-26 RX ADMIN — TAZOBACTAM SODIUM AND PIPERACILLIN SODIUM 3.38 G: 375; 3 INJECTION, SOLUTION INTRAVENOUS at 05:45

## 2017-01-26 RX ADMIN — LEVOTHYROXINE SODIUM 75 MCG: 75 TABLET ORAL at 07:13

## 2017-01-26 RX ADMIN — ISOSORBIDE MONONITRATE 60 MG: 30 TABLET, EXTENDED RELEASE ORAL at 08:00

## 2017-01-26 RX ADMIN — TAZOBACTAM SODIUM AND PIPERACILLIN SODIUM 3.38 G: 375; 3 INJECTION, SOLUTION INTRAVENOUS at 00:02

## 2017-01-26 RX ADMIN — PANTOPRAZOLE SODIUM 40 MG: 40 INJECTION, POWDER, FOR SOLUTION INTRAVENOUS at 08:03

## 2017-01-26 NOTE — PROGRESS NOTES
"GASTROENTEROLOGY PROGRESS NOTE        SUBJECTIVE:  Hard of hearing. No abdominal pain, nausea, vomiting. Denies fever or chills.      OBJECTIVE:    /88 mmHg  Pulse 70  Temp(Src) 98.2  F (36.8  C) (Oral)  Resp 16  Ht 1.448 m (4' 9\")  Wt 63.504 kg (140 lb)  BMI 30.29 kg/m2  SpO2 96%  Temp (24hrs), Av.9  F (37.2  C), Min:97.7  F (36.5  C), Max:101.2  F (38.4  C)    Patient Vitals for the past 72 hrs:   Weight   17 1816 63.504 kg (140 lb)       Intake/Output Summary (Last 24 hours) at 17 0907  Last data filed at 17 0240   Gross per 24 hour   Intake      0 ml   Output      5 ml   Net     -5 ml        PHYSICAL EXAM     Constitutional: Up in bedside chair.   Cardiovascular: RRR, normal S1, S2, no murmur appreciated   Respiratory: good transmission, CTAB  Abdomen: + BS, soft, NTTP  NEURO: CN 2-12 grossly intact, no focal deficits    Additional Comments:  ROS, FH, SH: See initial GI consult for details.     I have reviewed the patient's new clinical lab results:     Recent Labs   Lab Test  17   0620  17   1813   WBC  16.1*  14.6*   HGB  8.5*  10.2*   MCV  80  79   PLT  273  324   INR   --   1.16*     Recent Labs   Lab Test  17   0620  17   1813   POTASSIUM  3.6  4.2   CHLORIDE  105  98   CO2  26  26   BUN  17  22   ANIONGAP  10  11     Recent Labs   Lab Test  17   0620  17   2210  17   1911  17   1813   ALBUMIN  2.1*   --    --   2.5*   BILITOTAL  2.3*   --    --   2.0*   ALT  287*   --    --   360*   AST  343*   --    --   556*   PROTEIN   --    --   10*   --    LIPASE   --   34*   --    --         ASSESSMENT/ PLAN  Pamela Garcia is a 89-year-old female with a medical history of coronary disease status post coronary stenting ×5 in 2016 ( on Plavix and ASA) who presents with right upper quadrant pain and fever.  Patient was found to be bacteremic with source being cholecystitis. She was a poor surgical candidate so a " cholecystostomy was placed on 1/25 by IR.     1. Cholecystitis/ Sepsis:  Initial concern for cholangitis, however no bile duct dilation. Patient improving clinically. Transaminases trending down. No abdominal pain. Lily tube with brown/red drainage. Fever curve improving. Continued on Zosyn.   - No role for ERCP/ EUS at this time.   - Will no longer follow. Please call with questions for concerns.     Discussed with Dr. Figueroa.    Kathy Mcghee PA-C  [facility]

## 2017-01-26 NOTE — PLAN OF CARE
Problem: Goal Outcome Summary  Goal: Goal Outcome Summary  Outcome: Improving  VSS, no fever this shift.  Up with assist of one and a walker.  Incontinent of urine and also voiding in the bathroom.  Slept for much of the night.  Bed alarm on for safety.  Patient is up in the chair at this time.  Biliary tube is patent with minimal bloody output.  Continue with POC.

## 2017-01-26 NOTE — PLAN OF CARE
Problem: Goal Outcome Summary  Goal: Goal Outcome Summary    PT- Eval completed.  Pt is currently moving with SBA/CGA with mobility.  Pt able to perform sit to stand with SBA.  Walked 100 feet with WW and CGA.  Pt went up and down 8 steps with B rails and CGA.  Plan is for pt to discharge to her daughter's home.  Her family will provide 24 hour supervision initially.  Recommend home PT for safety and to progress mobility.

## 2017-01-26 NOTE — CONSULTS
Care Transition Initial Assessment -   Reason For Consult: discharge planning  Met with: Patient and Family    Active Problems:    Febrile illness, acute         DATA  Lives With: alone  Living Arrangements: condominium- PT is currently staying with her daughter and able to return to her home once dc from [facility]. daughter works, but has a sibling who can be with pt at home if needs are present.   Description of Support System: Supportive, Involved  Who is your support system: Children  Support Assessment: Adequate family and caregiver support, Adequate social supports. PT does well on her own.  Has a good social support in [place] and has family here in [facility]  Identified issues/concerns regarding health management: PT is being treated for sepsis due to cholecystitis    Patient feels that they have adequate support @ home?  Yes:       Quality Of Family Relationships: supportive  Transportation Available: car (patient reports she drives herself when at home)      ASSESSMENT  Cognitive Status:  awake, alert and oriented  Concerns to be addressed: AT this time no concern.s PT is very active at home, drives to daily mass and plays cards several time per week. PT is VERY [place]. Used Pocket Talker when in room.    PT is anxious to dc to home. Will have support from family to manage her drains. .       PLAN  Following but anticipate pt will dc to home with her daughter Angelica at [place]

## 2017-01-26 NOTE — PROGRESS NOTES
"[facility]  General Surgery Progress Note           Assessment and Plan:   Assessment:   PPD#1 s/p IR-placement of chris tube  Sepsis due to cholecystitis  Continued leukocytosis, occasional fevers      Plan:   -Zosyn  -Continue chris tube in place at least 6 weeks or indefinitely, as risk of cholecystitis returning if removed.  Tube instructions and dressing changes per IR.  -If future surgery being considered, Cardiology clearance needed to proceed   -Available if questions.  Will sign off at this time.         Interval History:   Denies pain, feeling feverish/chilled.  Occasional use of acetaminophen.  Hearing aides not working.         Physical Exam:   Blood pressure 165/88, pulse 70, temperature 98.2  F (36.8  C), temperature source Oral, resp. rate 16, height 1.448 m (4' 9\"), weight 63.504 kg (140 lb), SpO2 96 %.  Tmax 101.2F yest evening  I/O last 3 completed shifts:  In: -   Out: 5 [Urine:1; Drains:4]    Abdomen:   Soft, chris tube in place with IR dressing/attachment, no leakage around site      -Chris tube output is purulent red/brown           Data:       Recent Labs  Lab 01/25/17  1510 01/24/17  2133 01/24/17  1925 01/24/17  1838   CULT Canceled, Test credited Test reordered as correct code REORDERED AS A DRAINAGE CULTURE Culture negative monitoring continues Cultured on the 1st day of incubation: Lactose fermenting gram negative rodsCritical Value/Significant Value, preliminary result only, called to and read back by Eryn Rojas RN @ [facility] MS3, @ 0845 1/25/17. DH.Susceptibility testing done on previous specimen* Cultured on the 1st day of incubation: Escherichia coliCritical Value/Significant Value, preliminary result only, called to and read back by Esther Kunz RN @ [facility] MS3 @ 0800 1/25/17. DH.(Note)POSITIVE for E. COLI by [place] multiplex nucleic acid test. Finalidentification and antimicrobial susceptibility testing will beverified by standard methods.Specimen tested " with Verigene multiplex, gram-negative blood culturenucleic acid test for the following targets: Acinetobacter sp.,Citrobacter sp., Enterobacter sp., Proteus sp., E. coli, K.pneumoniae/oxytoca, P. aeruginosa, and the following resistancemarkers: CTXM, KPC, NDM, VIM, IMP and OXA.Critical Value/Significant Value called to and read back by Vivi RN at 1048 on 1/25/17 by YECENIA.*       Recent Labs  Lab 01/25/17  0620 01/24/17  1813   WBC 16.1* 14.6*   HGB 8.5* 10.2*   HCT 27.2* 32.3*   MCV 80 79    324       Recent Labs  Lab 01/25/17  0620 01/24/17  1813    135   POTASSIUM 3.6 4.2   CHLORIDE 105 98   CO2 26 26   ANIONGAP 10 11   * 207*   BUN 17 22   CR 0.60 0.63   GFRESTIMATED >90Non  GFR Calc 88   GFRESTBLACK >90African American GFR Calc >90African American GFR Calc   JORDAN 8.1* 8.9   PROTTOTAL 6.5* 7.8   ALBUMIN 2.1* 2.5*   BILITOTAL 2.3* 2.0*   ALKPHOS 414* 561*   * 556*   * 360*       Miryam Chaudhry PA-C       The patient has been seen and examined by me.  I agree with the above assessment and plan.  Patricia Mckeon MD

## 2017-01-26 NOTE — PLAN OF CARE
Problem: Goal Outcome Summary  Goal: Goal Outcome Summary  Outcome: Improving  Pt very [place], used pocket talker during assessment. Pt bilary drain CDI, red drainage from biliary drain. Pt has no c/o of pain. LS clear, up with one walker to BR. VSS, afebrile. Pt on Zozyn every 6. Cont with POC

## 2017-01-26 NOTE — PROGRESS NOTES
[facility]  Hospitalist Progress Note  Rocky Delgadillo MD 01/26/2017    Reason for Stay (Diagnosis): sepsis         Assessment and Plan:      Summary of Stay: Pamela Garcia is a 89-year-old female with a past medical history significant for coronary artery disease, most recently status post 5-stent placement at [facility] in 11/2016.  She is currently on dual antiplatelet therapy.  She presents to the emergency room with acute onset of generalized weakness without any preceding or constitutional symptoms.  Currently, the patient really does not have any complaints.  She is unaware that she is febrile.  She is not really complaining of any pain except upon my examination of her abdomen.  She is febrile with a T-max of 101, with elevated white count and elevated LFTs.  Working diagnosis is broad, including potential cholecystitis, potential cholangitis, versus possible intraabdominal causes for her fevers and white count.  She will be admitted to the hospital under s inpatient status for further evaluation and workup.    1.  sepsis secondary to acute cholecystitis - clinically improved and not toxic appearing.  Remains on Zosyn - will change to cipro as ecoli sensitive.  Blood cx + ecoli; cx from GB aspirate pending.  Appreciate GI and surgery input - s/p IR Placement of cholecystostomy tube this admit.  To leave in for at least 6 weeks.    2.  Lactic acidosis:  secondary to sepsis.  resolved    4.  Coronary artery disease:  She currently has no anginal symptoms, and cardiac-wise has been fairly stable since her stent placement.  Continue Plavix and ASA in setting of recent drug eluting stent placement to prevent in-stent thrombosis.    5.  Generalized weakness:  rehab consult.  Pt planning on living with her dtr for now as she recovers from this  6.  Hypertension:  Blood pressure currently is stable.  resumed her usual home medications including her Imdur and metoprolol with parameters.  "   7.  Hypothyroidism:  Resumed Synthroid.    8.  Hyperlipidemia:  Resumed statin.      9.  Dementia        DVT Prophylaxis: Pneumatic Compression Devices  Code Status: DNI  Discharge Dispo: home anticipated  Estimated Disch Date / # of Days until Disch: 1-2 days.  Pt VERY eager to leave tomorrow        Interval History (Subjective):      \"I want to go home today\".  Feels well.  No complaints                  Physical Exam:      Last Vital Signs:  /88 mmHg  Pulse 70  Temp(Src) 98.2  F (36.8  C) (Oral)  Resp 16  Ht 1.448 m (4' 9\")  Wt 63.504 kg (140 lb)  BMI 30.29 kg/m2  SpO2 96%      Intake/Output Summary (Last 24 hours) at 01/26/17 1159  Last data filed at 01/26/17 0240   Gross per 24 hour   Intake      0 ml   Output      5 ml   Net     -5 ml       Constitutional: Awake, alert, cooperative, no apparent distress.  Dtr Angelica present at bedside   Respiratory: Clear to auscultation bilaterally, no crackles or wheezing   Cardiovascular: Regular rate and rhythm, normal S1 and S2, and no murmur noted   Abdomen: Normal bowel sounds, soft, non-distended, non-tender.  Drain in place   Skin: No rashes, no cyanosis, dry to touch   Neuro: Alert and oriented x3, no weakness, numbness, memory loss.  [place]   Extremities: No edema, normal range of motion   Other(s):        All other systems: Negative          Medications:      All current medications were reviewed with changes reflected in problem list.         Data:      All new lab and imaging data was reviewed.   Labs:    Recent Labs  Lab 01/26/17  0915   WBC 12.1*   HGB 8.8*   HCT 27.9*   MCV 80         Imaging:   Recent Results (from the past 24 hour(s))   XR Biliary Drain Placement    Narrative    BILIARY DRAIN PLACEMENT 1/25/2017 3:17 PM    HISTORY: 89-year-old woman with inflammation noted around the  gallbladder. Of note, patient's gallbladder is noted to be filled with  gallstones and not distended. Nevertheless, obvious source for  underlying infection " is not identified.    TECHNIQUE: Patient was brought to the radiology department and  informed consent obtained. Patient was placed in a supine position.  Skin overlying the right upper quadrant was prepped and draped in  standard sterile fashion. 1% lidocaine was used for local anesthesia.  With continuous ultrasound guidance, a Yueh needle was advanced into  the gallbladder lumen. Contrast was injected demonstrating a  completely decompressed gallbladder lumen. Contrast eventually  extended through the cystic duct to the common bile duct, both of  which were found to be widely patent. A wire was placed into the  gallbladder lumen and after serial dilatation, an 8 Belgian pigtail  drainage catheter was placed in the gallbladder neck. I was only able  to obtain 1 to 2 mL sample of fluid as gallbladder was found to be  decompressed. The catheter was secured to patient's skin surface.  Patient tolerated the procedure well.    Sedation: 1 mg IV Versed, 50 mcg IV fentanyl.  Sedation time: 25 minutes  Total fluoroscopy time: 2 minutes  Total fluoroscopic dose: 40 mGy  Please note the above medications were administered by the radiology  staff under my direct supervision. Patient's vital signs were  monitored and remained stable throughout the procedure.    FINDINGS: A total of six spot fluoroscopic images obtained  demonstrating essentially no contrast enhancement in the gallbladder  lumen. Small amount collected in the gallbladder neck. The cystic  duct, common bile duct, common hepatic duct were all widely patent. No  intrahepatic biliary dilatation.      Impression    IMPRESSION:   1. Uncomplicated placement of 8 Belgian cholecystostomy tube. Only able  to obtain 1 to 2 mL of fluid for diagnostic purposes.  2. Cystic duct and biliary system found to be widely patent.

## 2017-01-27 VITALS
OXYGEN SATURATION: 96 % | WEIGHT: 143 LBS | DIASTOLIC BLOOD PRESSURE: 72 MMHG | BODY MASS INDEX: 30.85 KG/M2 | HEART RATE: 70 BPM | SYSTOLIC BLOOD PRESSURE: 164 MMHG | RESPIRATION RATE: 18 BRPM | HEIGHT: 57 IN | TEMPERATURE: 98.6 F

## 2017-01-27 LAB
BACTERIA SPEC CULT: ABNORMAL
BACTERIA SPEC CULT: ABNORMAL
BACTERIA SPEC CULT: NORMAL
Lab: ABNORMAL
MICRO REPORT STATUS: ABNORMAL
MICRO REPORT STATUS: ABNORMAL
MICRO REPORT STATUS: NORMAL
MICROORGANISM SPEC CULT: ABNORMAL
SPECIMEN SOURCE: ABNORMAL
SPECIMEN SOURCE: ABNORMAL
SPECIMEN SOURCE: NORMAL

## 2017-01-27 PROCEDURE — 25000125 ZZHC RX 250: Performed by: INTERNAL MEDICINE

## 2017-01-27 PROCEDURE — 25000132 ZZH RX MED GY IP 250 OP 250 PS 637: Mod: GY | Performed by: PHYSICIAN ASSISTANT

## 2017-01-27 PROCEDURE — 99231 SBSQ HOSP IP/OBS SF/LOW 25: CPT | Performed by: SURGERY

## 2017-01-27 PROCEDURE — A9270 NON-COVERED ITEM OR SERVICE: HCPCS | Mod: GY | Performed by: PHYSICIAN ASSISTANT

## 2017-01-27 PROCEDURE — 99239 HOSP IP/OBS DSCHRG MGMT >30: CPT | Performed by: INTERNAL MEDICINE

## 2017-01-27 PROCEDURE — 25000125 ZZHC RX 250: Performed by: PHYSICIAN ASSISTANT

## 2017-01-27 RX ORDER — CIPROFLOXACIN 500 MG/1
500 TABLET, FILM COATED ORAL 2 TIMES DAILY
Qty: 24 TABLET | Refills: 0 | Status: SHIPPED | OUTPATIENT
Start: 2017-01-27 | End: 2017-02-08

## 2017-01-27 RX ADMIN — CIPROFLOXACIN 400 MG: 2 INJECTION, SOLUTION INTRAVENOUS at 00:27

## 2017-01-27 RX ADMIN — ATORVASTATIN CALCIUM 40 MG: 40 TABLET, FILM COATED ORAL at 08:10

## 2017-01-27 RX ADMIN — PANTOPRAZOLE SODIUM 40 MG: 40 INJECTION, POWDER, FOR SOLUTION INTRAVENOUS at 06:45

## 2017-01-27 RX ADMIN — METOPROLOL TARTRATE 25 MG: 25 TABLET, FILM COATED ORAL at 08:10

## 2017-01-27 RX ADMIN — LEVOTHYROXINE SODIUM 75 MCG: 75 TABLET ORAL at 06:45

## 2017-01-27 RX ADMIN — CLOPIDOGREL 75 MG: 75 TABLET, FILM COATED ORAL at 08:10

## 2017-01-27 RX ADMIN — ISOSORBIDE MONONITRATE 60 MG: 30 TABLET, EXTENDED RELEASE ORAL at 08:10

## 2017-01-27 RX ADMIN — ASPIRIN 81 MG: 81 TABLET, COATED ORAL at 08:10

## 2017-01-27 NOTE — PLAN OF CARE
Problem: Infection, Risk/Actual (Adult)  Goal: Identify Related Risk Factors and Signs and Symptoms  Related risk factors and signs and symptoms are identified upon initiation of Human Response Clinical Practice Guideline (CPG)  Outcome: No Change  PM SHIFT  Nurse from [id number].   Pt alert and oriented. Pt children, grandchildren, and great grandchildren are present. Pt had a tiny inc smear of bm dark brown in color. Pt also had a small soft to loose bm and is afraid she is going to get loose stools from the antibiotics again like she did when she had a UTI and was on antibiotics. Pt ambulated up with walker and SBA to the BR. Pt very [place] and has a pocket talker which staff uses for communication. Pt had an exp wheeze noted in LL but it cleared with coughing and than LS clear. Pt gets CESAR. Pt on tele. SR noted. Pt has biliary drain in place. dge has small amt of bloody drainage. dsg CDI. Pt complains of min abd pain near drain insertion site but declines feeling the need for any prn pain medications. Pt really hopes to dc home tomorrow. Pt plans on staying with family for awhile on DC but otherwise lives alone. Pt started low fat diet this shift. Pt tolerated meal but only ate 25 % because she stated the food she ordered did not taste the best.

## 2017-01-27 NOTE — PLAN OF CARE
Problem: Goal Outcome Summary  Goal: Goal Outcome Summary  Outcome: No Change  Pt slept well during the night. Pt AO VSS. Denies any pain. Pt transfers with ax1/sba to with walker to the BR. Incont of urine during the night. Pt 's IV infiltrated. New IV placed per antoni RN. Pt has reduced fat diet. Tele reads NSR. Pt received cipro. Pt has patent drain with 15ml drainage. Pt very eager to dc home today.

## 2017-01-27 NOTE — PLAN OF CARE
Problem: Goal Outcome Summary  Goal: Goal Outcome Summary  Occupational Therapy Discharge Summary    Reason for therapy discharge:    Discharged to home with home therapy.    Progress towards therapy goal(s). See goals on Care Plan in [facility] electronic health record for goal details.  Goals not met.  Barriers to achieving goals:   discharge from facility.    Therapy recommendation(s):    Continued therapy is recommended.  Rationale/Recommendations:  Per POC, rec. continued OT at home to assist w/ increased safety/indep. w/ADl's/IADL's. Pt. discharged to home--rec. 24-hour assist/supervisin, Home OT. DC OT [facility]..

## 2017-01-27 NOTE — PROGRESS NOTES
Contacted by nursing regarding family wanting full code  Code status changed to full code   Rounding MD to readdress in am if needed

## 2017-01-27 NOTE — PROGRESS NOTES
[facility]  Met with pt and family to discuss plans for HC.  Pt to be discharged home today and has agreed to have [facility] follow with services of SN and PT. Central Intake processing referral.  Pt and family verbalized understanding that initial visit is scheduled for 1/28/17. Pt has 24 hour phone number for [facility] for any questions or concerns. Pt is staying with daughterAngelica [address] [place] [address].

## 2017-01-27 NOTE — PROGRESS NOTES
Pt feels well.  Wants to go home today.  dtr at bedside and agrees with this plan      Last 24 hours Vitals signs,imaging,microbiology;laboratory results were reviewed by me in EPIC  GENERAL:  Comfortable.  PSYCH: pleasant, oriented, No acute distress.  HEART:  Normal S1, S2 with no edema.  LUNGS:  Clear to auscultation, normal Respiratory effort.  ABDOMEN:  Soft, no hepatosplenomegaly, normal bowel sounds.  SKIN:  Dry to touch, No rash.   Lily tube with bloody drainage    WBC     12.1   1/26/2017  RBC     3.50   1/26/2017  HGB      8.8   1/26/2017  HCT     27.9   1/26/2017  No components found with this name: mct  MCV       80   1/26/2017  MCH     25.1   1/26/2017  MCHC     31.5   1/26/2017  RDW     17.5   1/26/2017  PLT      276   1/26/2017     A/p:  Acute cholecystitis - s/p cholesystostomy tube placement    Home today    >30 min spent on discharge today

## 2017-01-27 NOTE — PROGRESS NOTES
CTS:      It was discussed in rounds today that patient will be discharging home to daughters with drain in place.  Discussed need for homecare.    I met with patient and daughter at bedside.  Discussed DC plan and asked if she would like a home care RN to come and manage her drain. The Home care Rn would be able to provide some teaching as to how to care for drain at home.  Patient and daughter are agreeable to this. They do not have any preferences to home care agency, a referral will be sent to [facility].    KAI and Lyric Muller, [facility] liason have been updated.    Janelle Sneed, RN,BSN, CTS  [facility]  Care Coordination  [phone]

## 2017-01-27 NOTE — PLAN OF CARE
Problem: Goal Outcome Summary  Goal: Goal Outcome Summary  Outcome: Adequate for Discharge Date Met:  01/27/17  Pt discharged at this time. Educated pt and family members extensively regarding follow ups, when to notify MD and drain care. No further questions.

## 2017-01-27 NOTE — PLAN OF CARE
Problem: Goal Outcome Summary  Goal: Goal Outcome Summary  Outcome: Improving  Pt very [place], use pocket talker. Pt bilary drain CDI, red drainage from biliary drain, 35 ml output. Pt has no c/o of pain. VSS, LS clear, up with one walker to BR. VSS, afebrile. Pt on Zozyn every 6. Tele:SR. Ambien given at [facility] per request.

## 2017-01-27 NOTE — PLAN OF CARE
Problem: Individualization  Goal: Patient Preferences  Outcome: Adequate for Discharge Date Met:  01/27/17  Pt ok to dc to home. Radiology RN changed dressing and removed extender from tubing. Will f/u with rad nurses in 1 week for dressing changes. Reviewed dc instructions and follow up times. Reviewed drain care and how to empty. No further questions at this time.

## 2017-01-27 NOTE — PROGRESS NOTES
"CC: Cholecystostomy tube followup  Interval history: Tube is draining. Denies pain.    PE  /72 mmHg  Pulse 70  Temp(Src) 98.6  F (37  C) (Oral)  Resp 18  Ht 1.448 m (4' 9\")  Wt 64.864 kg (143 lb)  BMI 30.94 kg/m2  SpO2 96%  Constitutional: No acute distress  GI: Soft, nontender, nondistended. Drainage is dark and biliosanguineous     Data  N/A    A/P  S/p cholecystostomy  I recommend continuing the tube. It is not clear if the pt will be a candidate for surgery given her PMHx. I have provided Dr. Mckeon's contact into on the discharge instructions to revisit this.      Lebron Tanner MD  [phone] (p)   Click here to send text page.     This note was created using voice recognition software. Undetected word substitutions or other errors may have occurred.    "

## 2017-01-27 NOTE — PLAN OF CARE
Problem: Goal Outcome Summary  Goal: Goal Outcome Summary  Physical Therapy Discharge Summary    Reason for therapy discharge:    Discharged to home with home therapy.    Progress towards therapy goal(s). See goals on Care Plan in [facility] electronic health record for goal details.  Goals partially met.  Barriers to achieving goals:   needing CGA with mobility items, family able to provide.    Therapy recommendation(s):    Continued therapy is recommended.  Rationale/Recommendations:  .. Pt is below baseline for functional mobility and strength. Pt would benefit from continued skilled therapy to address these deficits.

## 2017-01-27 NOTE — DISCHARGE INSTRUCTIONS
Special instructions for Pamela Garcia:  Dr. Mckeon ([facility]) [phone]  - No specific follow up needed at this time.     -  Cholecystostomy Drain:  Keep dressing Dry  Look at incision and drain site every day to monitor for changes.   Change dressing weekly; you can call the Radiology Nurse line to make an appointment: [phone]      Take you antibiotic as directed until dose is complete.   Take with food to prevent upset stomach.  Eat yogurt daily as a form of a probiotic while taking your antibiotic.     When to call your health care provider  Call your health care provider if you notice any of these changes:    The amount of fluid increases or decreases suddenly.    Large amount of blood or a clot in drainage.    The color, odor, or thickness of the fluid changes.    The tube falls out or the incision opens.    The skin around the drain is red, swollen, painful, or seeping pus.    You have a fever over 101.5 F (38.6 C) or chills.    Discharge to home with [facility] Care services. The staff will call to schedule the first visit. Their phone number is [phone].

## 2017-01-27 NOTE — PROGRESS NOTES
Family here, stated that pt would like full code, not DNI, stated pt did not understand question when asked.  Discussed with pt, she clarified earlier answer and stated that she would like to be full code.  Paged MD with family/pt request.

## 2017-01-28 LAB
BACTERIA SPEC CULT: ABNORMAL
MICRO REPORT STATUS: ABNORMAL
MICROORGANISM SPEC CULT: ABNORMAL
MICROORGANISM SPEC CULT: ABNORMAL
SPECIMEN SOURCE: ABNORMAL

## 2017-01-30 NOTE — DISCHARGE SUMMARY
PRIMARY CARE PHYSICIAN:  Dr. Lemuel Riggs at the [facility] located at [address], [place], [place].         GENERAL SURGERY:  Dr. Patricia Mckeon.       DATE OF ADMISSION:  01/24/2017.       DATE OF DISCHARGE:  01/27/2017.       PRINCIPAL FINAL DIAGNOSIS:  Sepsis secondary to acute cholecystitis.  Blood culture is positive for Escherichia coli.  Gallbladder aspirate culture pending.  The decision was made to pursue cholecystostomy tube for treatment given the patient's complicated cardiac history and recent myocardial infarction making risk for perioperative complications higher.  Surgery is recommending continuing cholecystostomy tube for at least 6 weeks.  Notably, during biliary drain placement via Interventional Radiology dye was infused showing the cystic duct, common bile duct, and common hepatic duct all widely patent.  Gallstones were noted on imaging studies.      PAST MEDICAL HISTORY:    1.  Dementia.   2.  Hyperlipidemia.   3.  Hyperthyroidism.    4.  Hypertension.    5.  Coronary artery disease.  The patient had a recent myocardial infarction 11/2016 treated at [facility] with drug-eluting stent placement.  She reportedly had 5 stents placed and is currently on Plavix and instructed to take for 1 year post procedure.       PRINCIPAL PROCEDURES THIS ADMISSION:   1.  Head CT scan showing no acute findings.    2.  Chest x-ray showing no acute findings.   3.  Abdominal pelvic CT scan showing cholelithiasis and gallbladder wall thickening and surrounding inflammation consistent with acute cholecystitis.   4.  Abdominal ultrasound showing stones and sludge in the gallbladder as well as gallbladder wall thickening may be acute cholecystitis.   5.  Interventional Radiology biliary drain placement/cholecystostomy tube.  The patient tolerated the procedure well.   6.  Blood cultures positive for E. coli, pansensitive to all antibiotics tested.   7.  Gallbladder drainage culture growing  gram-negative rods, further identification pending.  Presumably this will be E. coli, which was isolated in the blood.   6.  General Surgery consultation by Dr. Patricia Mckeon.      REASON FOR ADMISSION:  Please see dictated history and physical by Terri Mac.  In brief, Ms. Pamela Garcia is a 89-year-old female with a history of coronary disease and complicated history as detailed above including recent myocardial infarction 11/2016 treated with drug-eluting stent placement.  She presented to the hospital for concerns about weakness, fever, confusion.  She was noted to have abnormal liver function tests and early sepsis.  She was admitted to the hospitalist service.        HOSPITAL COURSE:  Sepsis secondary to acute cholecystitis:  The patient was admitted to the general medical floor.  Hemodynamically she remained stable.  Symptoms rapidly improved with IV antibiotic therapy and hydration.  Workup noted above, including evidence of acute cholecystitis.  Blood cultures eventually grew out E. coli, presumed secondary to cholecystitis.  She was seen by General Surgery.  Given the risk of surgery in the setting of recent MI and need for continuous antiplatelet medication in the setting of drug-eluting stent placement in November, decision was made to pursue cholecystostomy tube as a treatment.  She responded to placement of the tube well.  She has now been afebrile for at least 24 hours and appears stable for discharge from the hospital today.  She will be discharged on oral ciprofloxacin for another 12 days to complete her clinical course.  Surgery is recommending to continue cholecystostomy tube for at least 6 weeks.      The patient discharged home today.  She is planning living with her daughter for the next several weeks.  The patient normally resides at [place], [place].      DISCHARGE MEDICATIONS:   1.  Ciprofloxacin 500 mg twice a day for 12 days.   2.  Aspirin 81 mg daily.   3.  Lipitor 40 mg daily.    4.  Calcium with vitamin D.   5.  Centrum Silver.   6.  Plavix 75 mg daily.   7.  Imdur 60 mg daily.   8.  Levothyroxine 75 mcg daily.   9.  Metoprolol 25 mg twice a day.   10.  Nitroglycerin as needed.   11.  Pantoprazole 20 mg daily.      FOLLOWUP INSTRUCTIONS:   1.  Follow with primary MD for routine check after hospitalization in 5-7 days.   2.  Keep cholecystostomy tube in place for at least 6 weeks.   3.  Follow with Dr. Mckeon of General Surgery in 4-6 weeks.  The patient was given phone number to make appointment.   4.  Home PT and home health nurse ordered on discharge.         GUSTAVO BISHOP MD             D: 2017 16:50   T: 2017 17:31   MT: #[id number]      Name:     CODIE ROWE   MRN:      [id number]        Account:        [id number]   :      1925           Admit Date:                                       Discharge Date: 2017      Document: [id number]       cc: Patricia Riggs DO

## 2017-02-01 LAB
BACTERIA SPEC CULT: NORMAL
Lab: NORMAL
MICRO REPORT STATUS: NORMAL
SPECIMEN SOURCE: NORMAL

## 2017-02-03 ENCOUNTER — HOSPITAL ENCOUNTER (OUTPATIENT)
Dept: GENERAL RADIOLOGY | Facility: CLINIC | Age: 82
Discharge: HOME | End: 2017-02-03
Attending: RADIOLOGY | Admitting: RADIOLOGY
Payer: MEDICARE

## 2017-02-03 DIAGNOSIS — Z48.00 DRESSING CHANGE: ICD-10-CM

## 2017-02-03 PROCEDURE — 99211 OFF/OP EST MAY X REQ PHY/QHP: CPT | Mod: TC

## 2017-02-03 NOTE — PROGRESS NOTES
Pt here with daughters for gallbladder drain dressing change.  Site clean and dry with intact suture.  New tube stabilizer placed and site care reviewed.  2 extra holders sent with daughters for home care nurse.  IR nursing phone number given for any questions or follow up with Dr. Mckeon.

## 2017-02-16 ENCOUNTER — HOSPITAL ENCOUNTER (OUTPATIENT)
Dept: GENERAL RADIOLOGY | Facility: CLINIC | Age: 82
Discharge: HOME | End: 2017-02-16
Attending: RADIOLOGY | Admitting: RADIOLOGY
Payer: MEDICARE

## 2017-02-16 DIAGNOSIS — T88.8XXD FLUID COLLECTION AT SURGICAL SITE, SUBSEQUENT ENCOUNTER: ICD-10-CM

## 2017-02-16 PROCEDURE — 99211 OFF/OP EST MAY X REQ PHY/QHP: CPT | Mod: TC

## 2017-02-16 NOTE — PROGRESS NOTES
Pt family called this RN reporting a sore around the area of her gallbladder drain. Since family is willing and able to bring pt over for exam, the agreed to stop by for a nurse visit. Upon exam the actual tube, dressing and care looks great but there is a superficial blister like sore approx a half inch in diameter (at most) to skin outside the dressing. We discussed and decided to just observe for now, treat with topical antibiotic and bandaid, and please let us know if it gets larger or she has any other symptoms of infection. Pt discharged in satisfactory condition.

## 2017-02-27 ENCOUNTER — OFFICE VISIT (OUTPATIENT)
Dept: SURGERY | Facility: CLINIC | Age: 82
End: 2017-02-27
Payer: MEDICARE

## 2017-02-27 DIAGNOSIS — K81.0 ACUTE CHOLECYSTITIS: Primary | ICD-10-CM

## 2017-02-27 PROCEDURE — 99213 OFFICE O/P EST LOW 20 MIN: CPT | Performed by: SURGERY

## 2017-02-27 NOTE — PROGRESS NOTES
Assessment and Plan:    Pamela Garcia is seen in follow up for acute cholecystitis treated with percutaneous cholecystostomy tube.                We will schedule an appointment for her with IR for interrogation of the tube.  If the tube is confirmed to be within the gallbladder lumen, I would recommend continuing drainage to gravity until the bile drainage clears (nonpurulent).     The patient will follow up with her cardiologist at [facility] in the next couple of months.  She will ask her cardiologist if she is a candidate for laparoscopic, possible open, cholecystectomy.  She will also inquire into the optimal timing of surgery.      Chief complaint:  Follow up recent hospital admission for acute cholecystitis.    HPI:  This patient is a 89 year old female with a history of coronary artery disease status post MI in November 2016 at which time a drug-eluting stent was placed and the patient has been on Plavix (1 yr recommended) and aspirin since.  Due to her high surgical risk and need for ongoing anticoagulation, a percutaneous cholecystostomy tube was placed by IR.  She was discharged from the hospital on January 24, 2017.  Since her discharge from the hospital the drain has been to gravity drainage and the family has been recording outputs.  She has been draining approximately 7 cc of purulent tan fluid daily.  The family is not flushing the catheter.  The patient denies fever, chills, nausea, vomiting, abdominal pain.        Past Medical History:   has a past medical history of Arthritis; GI bleed; H/O heart artery stent; and Thyroid disease.    Past Surgical History:  Past Surgical History   Procedure Laterality Date     Cardiac surgery       stents     Gyn surgery         Social History:  Social History     Social History     Marital status:      Spouse name: N/A     Number of children: N/A     Years of education: N/A     Occupational History     Not on file.     Social History Main Topics     Smoking  status: Never Smoker     Smokeless tobacco: Not on file     Alcohol use Not on file     Drug use: Not on file     Sexual activity: Not on file     Other Topics Concern     Not on file     Social History Narrative        Family History:  No family history on file.    Review of Systems:  The 10 point review of systems is negative other than noted in the HPI and above.    Physical Exam:  Vitals: There were no vitals taken for this visit.  BMI= There is no height or weight on file to calculate BMI.  General - Well developed, well nourished female in no apparent distress  HEENT:  Head normocephalic and atraumatic, pupils equal and round, conjunctivae clear, no scleral icterus, mucous membranes moist, external ears and nose normal  Abdomen:   soft, obese, non-distended without tenderness.  Right upper quadrant drain with purulent tan scant fluid in the bag.  Extremities: Warm without edema  Neurologic: alert, speech is clear, moves all extremities with good strength  Psychiatric: Mood and affect appropriate  Skin: Without lesions, rashes or juandice    Relevant labs:    WBC -   WBC   Date Value Ref Range Status   01/26/2017 12.1 (H) 4.0 - 11.0 10e9/L Final   ], HgB -   Hemoglobin   Date Value Ref Range Status   01/26/2017 8.8 (L) 11.7 - 15.7 g/dL Final   ]  Liver Function Studies -   Recent Labs   Lab Test  01/26/17   0915   PROTTOTAL  6.1*   ALBUMIN  2.1*   BILITOTAL  1.7*   ALKPHOS  375*   AST  370*   ALT  313*             Imaging:  All imaging studies reviewed by me.    BILIARY DRAIN PLACEMENT 1/25/2017 3:17 PM     HISTORY: 89-year-old woman with inflammation noted around the  gallbladder. Of note, patient's gallbladder is noted to be filled with  gallstones and not distended. Nevertheless, obvious source for  underlying infection is not identified.     TECHNIQUE: Patient was brought to the radiology department and  informed consent obtained. Patient was placed in a supine position.  Skin overlying the right upper  quadrant was prepped and draped in  standard sterile fashion. 1% lidocaine was used for local anesthesia.  With continuous ultrasound guidance, a Yueh needle was advanced into  the gallbladder lumen. Contrast was injected demonstrating a  completely decompressed gallbladder lumen. Contrast eventually  extended through the cystic duct to the common bile duct, both of  which were found to be widely patent. A wire was placed into the  gallbladder lumen and after serial dilatation, an 8 Serbian pigtail  drainage catheter was placed in the gallbladder neck. I was only able  to obtain 1 to 2 mL sample of fluid as gallbladder was found to be  decompressed. The catheter was secured to patient's skin surface.  Patient tolerated the procedure well.     Sedation: 1 mg IV Versed, 50 mcg IV fentanyl.  Sedation time: 25 minutes  Total fluoroscopy time: 2 minutes  Total fluoroscopic dose: 40 mGy  Please note the above medications were administered by the radiology  staff under my direct supervision. Patient's vital signs were  monitored and remained stable throughout the procedure.     FINDINGS: A total of six spot fluoroscopic images obtained  demonstrating essentially no contrast enhancement in the gallbladder  lumen. Small amount collected in the gallbladder neck. The cystic  duct, common bile duct, common hepatic duct were all widely patent. No  intrahepatic biliary dilatation.         IMPRESSION:   1. Uncomplicated placement of 8 Serbian cholecystostomy tube. Only able  to obtain 1 to 2 mL of fluid for diagnostic purposes.  2. Cystic duct and biliary system found to be widely patent.     ELENA SANTOS MD        This note was created using voice recognition software. Undetected word substitutions or other errors may have occurred.     Time spent with the patient with greater that 50% of the time in discussion was 15 minutes.     Patricia Mckeon MD  [facility], [place]    Please route or send letter to:  Primary  Care Provider (PCP) and Referring Provider

## 2017-02-27 NOTE — MR AVS SNAPSHOT
"              After Visit Summary   2/27/2017    Pamela Garcia    MRN: [id number]           Patient Information     Date Of Birth          12/18/1925        Visit Information        Provider Department      2/27/2017 1:00 PM Patricia Mckeon MD [facility] General Surgery      Today's Diagnoses     Acute cholecystitis    -  1       Follow-ups after your visit        Future tests that were ordered for you today     Open Future Orders        Priority Expected Expires Ordered    XR Cholangiogram T Tube Routine 2/27/2017 2/27/2018 2/27/2017    XR Cholangiogram T Tube Routine 2/27/2017 2/27/2018 2/27/2017            Who to contact     If you have questions or need follow up information about today's clinic visit or your schedule please contact [facility] directly at [phone].  Normal or non-critical lab and imaging results will be communicated to you by SendRRhart, letter or phone within 4 business days after the clinic has received the results. If you do not hear from us within 7 days, please contact the clinic through SendRRhart or phone. If you have a critical or abnormal lab result, we will notify you by phone as soon as possible.  Submit refill requests through [employer] or call your pharmacy and they will forward the refill request to us. Please allow 3 business days for your refill to be completed.          Additional Information About Your Visit        SendRRhart Information     Tech[employer] lets you send messages to your doctor, view your test results, renew your prescriptions, schedule appointments and more. To sign up, go to [email or web address] . Click on \"Log in\" on the left side of the screen, which will take you to the Welcome page. Then click on \"Sign up Now\" on the right side of the page.     You will be asked to enter the access code listed below, as well as some personal information. Please follow the directions to create your " username and password.     Your access code is: [email or web address]  Expires: 2017  9:49 AM     Your access code will  in 90 days. If you need help or a new code, please call your [facility] or [phone].        Care EveryWhere ID     This is your Care EveryWhere ID. This could be used by other organizations to access your [facility] medical records  [id number]         Blood Pressure from Last 3 Encounters:   17 164/72    Weight from Last 3 Encounters:   17 143 lb (64.9 kg)              Today, you had the following     No orders found for display       Primary Care Provider Office Phone # Fax #    Lemuel Riggs [phone]       [facility] [place] [address] [place] [address]        Thank you!     Thank you for choosing [facility]  for your care. Our goal is always to provide you with excellent care. Hearing back from our patients is one way we can continue to improve our services. Please take a few minutes to complete the written survey that you may receive in the mail after your visit with us. Thank you!             Your Updated Medication List - Protect others around you: Learn how to safely use, store and throw away your medicines at www.disposemymeds.org.          This list is accurate as of: 17  2:28 PM.  Always use your most recent med list.                   Brand Name Dispense Instructions for use    aspirin 81 MG tablet      Take 81 mg by mouth daily       atorvastatin 40 MG tablet    LIPITOR     Take 40 mg by mouth daily       calcium-vitamin D 600-400 MG-UNIT per tablet    CALTRATE     Take 1 tablet by mouth 2 times daily       CENTRUM SILVER per tablet      Take 1 tablet by mouth daily       clopidogrel 75 MG tablet    PLAVIX     Take 75 mg by mouth daily       isosorbide mononitrate 60 MG 24 hr tablet    IMDUR     Take 60 mg by mouth daily       levothyroxine 75 MCG tablet    SYNTHROID/LEVOTHROID     Take 75 mcg by mouth daily        metoprolol 25 MG tablet    LOPRESSOR     Take 25 mg by mouth 2 times daily       NITROTAB SL      Place 0.4 mg under the tongue       pantoprazole 20 MG EC tablet    PROTONIX     Take 20 mg by mouth daily

## 2017-02-27 NOTE — LETTER
2017    RE:  Pamela Garcia-:  25    Assessment and Plan:     Pamela Garcia is seen in follow up for acute cholecystitis treated with percutaneous cholecystostomy tube.        We will schedule an appointment for her with IR for interrogation of the tube. If the tube is confirmed to be within the gallbladder lumen, I would recommend continuing drainage to gravity until the bile drainage clears (nonpurulent).      The patient will follow up with her cardiologist at [facility] in the next couple of months. She will ask her cardiologist if she is a candidate for laparoscopic, possible open, cholecystectomy. She will also inquire into the optimal timing of surgery.      Chief complaint:  Follow up recent hospital admission for acute cholecystitis.     HPI:  This patient is a 89 year old female with a history of coronary artery disease status post MI in 2016 at which time a drug-eluting stent was placed and the patient has been on Plavix (1 yr recommended) and aspirin since. Due to her high surgical risk and need for ongoing anticoagulation, a percutaneous cholecystostomy tube was placed by IR. She was discharged from the hospital on 2017. Since her discharge from the hospital the drain has been to gravity drainage and the family has been recording outputs. She has been draining approximately 7 cc of purulent tan fluid daily. The family is not flushing the catheter. The patient denies fever, chills, nausea, vomiting, abdominal pain.       Past Medical History:  Has a past medical history of Arthritis; GI bleed; H/O heart artery stent; and Thyroid disease.     Review of Systems:  The 10 point review of systems is negative other than noted in the HPI and above.     Physical Exam:  Vitals: There were no vitals taken for this visit.  BMI= There is no height or weight on file to calculate BMI.  General - Well developed, well nourished female in no apparent distress  HEENT: Head  normocephalic and atraumatic, pupils equal and round, conjunctivae clear, no scleral icterus, mucous membranes moist, external ears and nose normal  Abdomen:  soft, obese, non-distended without tenderness. Right upper quadrant drain with purulent tan scant fluid in the bag.  Extremities: Warm without edema  Neurologic: alert, speech is clear, moves all extremities with good strength  Psychiatric: Mood and affect appropriate  Skin: Without lesions, rashes or juandice     Imaging:  All imaging studies reviewed by me.     BILIARY DRAIN PLACEMENT 1/25/2017 3:17 PM      HISTORY: 89-year-old woman with inflammation noted around the  gallbladder. Of note, patient's gallbladder is noted to be filled with  gallstones and not distended. Nevertheless, obvious source for  underlying infection is not identified.      TECHNIQUE: Patient was brought to the radiology department and  informed consent obtained. Patient was placed in a supine position.  Skin overlying the right upper quadrant was prepped and draped in  standard sterile fashion. 1% lidocaine was used for local anesthesia.  With continuous ultrasound guidance, a Yueh needle was advanced into  the gallbladder lumen. Contrast was injected demonstrating a  completely decompressed gallbladder lumen. Contrast eventually  extended through the cystic duct to the common bile duct, both of  which were found to be widely patent. A wire was placed into the  gallbladder lumen and after serial dilatation, an 8 Yor[place] pigtail  drainage catheter was placed in the gallbladder neck. I was only able  to obtain 1 to 2 mL sample of fluid as gallbladder was found to be  decompressed. The catheter was secured to patient's skin surface.  Patient tolerated the procedure well.      Sedation: 1 mg IV Versed, 50 mcg IV fentanyl.  Sedation time: 25 minutes  Total fluoroscopy time: 2 minutes  Total fluoroscopic dose: 40 mGy  Please note the above medications were administered by the  radiology  staff under my direct supervision. Patient's vital signs were  monitored and remained stable throughout the procedure.      FINDINGS: A total of six spot fluoroscopic images obtained  demonstrating essentially no contrast enhancement in the gallbladder  lumen. Small amount collected in the gallbladder neck. The cystic  duct, common bile duct, common hepatic duct were all widely patent. No  intrahepatic biliary dilatation.          IMPRESSION:   1. Uncomplicated placement of 8 [place] cholecystostomy tube. Only able  to obtain 1 to 2 mL of fluid for diagnostic purposes.  2. Cystic duct and biliary system found to be widely patent.      ELENA SANTOS MD        This note was created using voice recognition software. Undetected word substitutions or other errors may have occurred.        Patricia Mckeon MD  [facility]

## 2017-03-02 ENCOUNTER — TELEPHONE (OUTPATIENT)
Dept: SURGERY | Facility: CLINIC | Age: 82
End: 2017-03-02

## 2017-03-02 ENCOUNTER — HOSPITAL ENCOUNTER (OUTPATIENT)
Facility: CLINIC | Age: 82
Discharge: HOME | End: 2017-03-02
Attending: RADIOLOGY | Admitting: RADIOLOGY
Payer: MEDICARE

## 2017-03-02 ENCOUNTER — APPOINTMENT (OUTPATIENT)
Dept: GENERAL RADIOLOGY | Facility: CLINIC | Age: 82
End: 2017-03-02
Attending: SURGERY
Payer: MEDICARE

## 2017-03-02 DIAGNOSIS — K81.9 CHOLECYSTITIS: Primary | ICD-10-CM

## 2017-03-02 DIAGNOSIS — K81.0 ACUTE CHOLECYSTITIS: ICD-10-CM

## 2017-03-02 DIAGNOSIS — K81.0 ACUTE CHOLECYSTITIS: Primary | ICD-10-CM

## 2017-03-02 PROCEDURE — 47531 INJECTION FOR CHOLANGIOGRAM: CPT

## 2017-03-02 PROCEDURE — 25500064 ZZH RX 255 OP 636: Performed by: RADIOLOGY

## 2017-03-02 RX ORDER — IOPAMIDOL 510 MG/ML
50 INJECTION, SOLUTION INTRAVASCULAR ONCE
Status: COMPLETED | OUTPATIENT
Start: 2017-03-02 | End: 2017-03-02

## 2017-03-02 RX ADMIN — IOPAMIDOL 25 ML: 510 INJECTION, SOLUTION INTRAVASCULAR at 09:50

## 2017-03-02 NOTE — TELEPHONE ENCOUNTER
Verbal order for Augmentin 875-125 MG BID for 4 weeks (28 days) per Dr. Mckeon.  Spoke with patients daughter, preferred pharmacy confirmed as [facility] in [place].  Also advised probiotic.  Daughter is aware, it may take [facility] a couple of hours to fill RX.  Telma Cortes RN

## 2017-03-02 NOTE — TELEPHONE ENCOUNTER
Per Dr. Mckeon:  Tube in place, antibiotics (Augmentin) are not needed.  Patient to Follow-up with Dr. Cox in 2 weeks.    Patients daughter notified.  [facility] called and order canceled.  Telma Cortes RN

## 2017-03-02 NOTE — PROGRESS NOTES
Pt came to Radiology for gallbladder tube check. Dr Cox injected tube which originally we thought was in the colon however on further exam Dr Cox believes the tube is in the gallbladder after all and he is visualizing stones in the gallblagdder. Pt is otherwise feeling very good. Tube will remain where it is for a few more weeks when patient will then be evaluated as to next steps. Pt is currently on Plavix due to having 5 stents placed in Nov and is not able to go off her Plavix yet. Replaced dressing, tubing and bag and all instructions given to pt and her daughter.

## 2017-03-20 NOTE — PROGRESS NOTES
Nurse visit dressing change completed to biliary tube site, completed dressing change including all tubing and drainage bag. Site without redness, or drainage. Patient discharged via wheelchair with daughter in stable condition.

## 2017-04-12 NOTE — PROGRESS NOTES
Dressing change to biliary tube site completed without difficulty including all tubing and drainage bag. Small amount of redness noted around suture site, no drainage and patient denies pain. Discharged to home via wheelchair in stable condition with daughter.

## 2017-05-30 NOTE — TELEPHONE ENCOUNTER
Called received from pt's son Aidan.  Concern about pt's gallbladder drain tube that wa placed in January.  States pt feels well with no fevers but output minimal from drain.  Discussed with Dr. Torres.  Recommends pt to follow up with DR. Mckeon for further evaluation if surgery indicated.  No imaging needed at this time.   This information has been given to Aidan as well as DR. Mckeon's office number.

## 2017-06-06 NOTE — PROGRESS NOTES
Assessment and Plan:     Pamela Garcia is seen in follow up for acute cholecystitis treated with percutaneous cholecystostomy tube.              She had an IR interrogation of the tube on march 2, 2017 which revealed the tube to be in good position, a patent cystic duct and common bile duct.       The patient will follow up with her cardiologist at [facility] next week.  She will ask her cardiologist if she is a candidate for laparoscopic, possible open, cholecystectomy.  She will also inquire into when she will be able to hold Plavix for surgery.    I recommend that she keep the tube in place at least until November 2017.  If it is decided that she will not proceed with surgery, the tube may be left in place indefinitely.  The tube can be capped at any time.  We will refer her to interventional radiology for instructions and equipment.        Chief complaint:  Follow up recent hospital admission for acute cholecystitis.     HPI:  This patient is a 89 year old female, presenting with her son, with a history of coronary artery disease status post MI in November 2016 at which time a drug-eluting stent was placed and the patient has been on Plavix (1 yr recommended) and aspirin since.  Due to her high surgical risk and need for ongoing anticoagulation, a percutaneous cholecystostomy tube was placed by IR.  She was discharged from the hospital on January 24, 2017.  Since her discharge from the hospital the drain has been to gravity drainage and she reports 5-10 cc of purulent tan fluid qod.  The patient denies fever, chills, nausea, vomiting, abdominal pain.         Past Medical History:   has a past medical history of Arthritis; GI bleed; H/O heart artery stent; and Thyroid disease.    PE:  There were no vitals taken for this visit.  General - Well developed, well nourished female in no apparent distress  HEENT:  Head normocephalic and atraumatic, pupils equal and round, conjunctivae clear, no scleral icterus, mucous  membranes moist, external ears and nose normal  Abdomen:   soft, obese, non-distended without tenderness. no masses palpated.  The cholecystostomy tube is to gravity drainage.  There is a scant amount of tan fluid in the bag.  Extremities: Warm without edema  Musculoskeletal:  Normal station and gait  Neurologic: nonfocal  Psychiatric: Mood and affect appropriate  Skin: Without lesions or rashes, or juandice      This note was created using voice recognition software. Undetected word substitutions or other errors may have occurred.     Time spent of which more than 50% was counseling and coordinating care:  10 minutes.    Patricia Mckeon MD      Please route or send letter to:  Primary Care Provider (PCP) and Referring Provider  Dr. Rogers cardiology, [facility]

## 2017-06-06 NOTE — MR AVS SNAPSHOT
"              After Visit Summary   2017    Pamela Garcia    MRN: [id number]           Patient Information     Date Of Birth          1925        Visit Information        Provider Department      2017 11:00 AM Patricia Mckeon MD [facility] General Surgery      Today's Diagnoses     Acute cholecystitis    -  1       Follow-ups after your visit        Who to contact     If you have questions or need follow up information about today's clinic visit or your schedule please contact [facility] directly at [phone].  Normal or non-critical lab and imaging results will be communicated to you by [employer], letter or phone within 4 business days after the clinic has received the results. If you do not hear from us within 7 days, please contact the clinic through [employer] or phone. If you have a critical or abnormal lab result, we will notify you by phone as soon as possible.  Submit refill requests through [employer] or call your pharmacy and they will forward the refill request to us. Please allow 3 business days for your refill to be completed.          Additional Information About Your Visit        MyChart Information     [employer] lets you send messages to your doctor, view your test results, renew your prescriptions, schedule appointments and more. To sign up, go to [email or web address] . Click on \"Log in\" on the left side of the screen, which will take you to the Welcome page. Then click on \"Sign up Now\" on the right side of the page.     You will be asked to enter the access code listed below, as well as some personal information. Please follow the directions to create your username and password.     Your access code is: [email or web address]  Expires: 2017 11:50 AM     Your access code will  in 90 days. If you need help or a new code, please call your [facility] clinic or [phone].        Care EveryWhere ID     This is " your Care EveryWhere ID. This could be used by other organizations to access your [facility] medical records  [id number]         Blood Pressure from Last 3 Encounters:   01/27/17 164/72    Weight from Last 3 Encounters:   01/27/17 143 lb (64.9 kg)              Today, you had the following     No orders found for display       Primary Care Provider Office Phone # Fax #    Lemuel Riggs [phone]       [facility] [place] [address] [place] [address]        Thank you!     Thank you for choosing [facility]  for your care. Our goal is always to provide you with excellent care. Hearing back from our patients is one way we can continue to improve our services. Please take a few minutes to complete the written survey that you may receive in the mail after your visit with us. Thank you!             Your Updated Medication List - Protect others around you: Learn how to safely use, store and throw away your medicines at www.disposemymeds.org.          This list is accurate as of: 6/6/17 11:50 AM.  Always use your most recent med list.                   Brand Name Dispense Instructions for use    aspirin 81 MG tablet      Take 81 mg by mouth daily       atorvastatin 40 MG tablet    LIPITOR     Take 40 mg by mouth daily       calcium-vitamin D 600-400 MG-UNIT per tablet    CALTRATE     Take 1 tablet by mouth 2 times daily       CENTRUM SILVER per tablet      Take 1 tablet by mouth daily       clopidogrel 75 MG tablet    PLAVIX     Take 75 mg by mouth daily       isosorbide mononitrate 60 MG 24 hr tablet    IMDUR     Take 60 mg by mouth daily       levothyroxine 75 MCG tablet    SYNTHROID/LEVOTHROID     Take 75 mcg by mouth daily       metoprolol 25 MG tablet    LOPRESSOR     Take 25 mg by mouth 2 times daily       NITROTAB SL      Place 0.4 mg under the tongue       pantoprazole 20 MG EC tablet    PROTONIX     Take 20 mg by mouth daily

## 2017-06-06 NOTE — LETTER
2017      RE:  Pamela Garcia-:  25    Assessment and Plan:      Pamela Garcia is seen in follow up for acute cholecystitis treated with percutaneous cholecystostomy tube.          She had an IR interrogation of the tube on 2017 which revealed the tube to be in good position, a patent cystic duct and common bile duct.        The patient will follow up with her cardiologist at [facility] next week.  She will ask her cardiologist if she is a candidate for laparoscopic, possible open, cholecystectomy.  She will also inquire into when she will be able to hold Plavix for surgery.    I recommend that she keep the tube in place at least until 2017.  If it is decided that she will not proceed with surgery, the tube may be left in place indefinitely.  The tube can be capped at any time.  We will refer her to interventional radiology for instructions and equipment.          Chief complaint:  Follow up recent hospital admission for acute cholecystitis.      HPI:  This patient is a 89 year old female, presenting with her son, with a history of coronary artery disease status post MI in 2016 at which time a drug-eluting stent was placed and the patient has been on Plavix (1 yr recommended) and aspirin since.  Due to her high surgical risk and need for ongoing anticoagulation, a percutaneous cholecystostomy tube was placed by IR.  She was discharged from the hospital on 2017.  Since her discharge from the hospital the drain has been to gravity drainage and she reports 5-10 cc of purulent tan fluid qod.  The patient denies fever, chills, nausea, vomiting, abdominal pain.          Past Medical History:   has a past medical history of Arthritis; GI bleed; H/O heart artery stent; and Thyroid disease.     PE:  There were no vitals taken for this visit.  General - Well developed, well nourished female in no apparent distress  HEENT:  Head normocephalic and atraumatic, pupils  equal and round, conjunctivae clear, no scleral icterus, mucous membranes moist, external ears and nose normal  Abdomen:                        soft, obese, non-distended without tenderness. no masses palpated.  The cholecystostomy tube is to gravity drainage.  There is a scant amount of tan fluid in the bag.  Extremities: Warm without edema  Musculoskeletal:  Normal station and gait  Neurologic: nonfocal  Psychiatric: Mood and affect appropriate  Skin: Without lesions or rashes, or juandice        This note was created using voice recognition software. Undetected word substitutions or other errors may have occurred.           Patricia Mckeon MD

## 2017-06-07 NOTE — TELEPHONE ENCOUNTER
"Spoke with patient's son Aidan at length regarding request from Dr. Mckeon's office to cap tube. Patient's son stated that Pamela does not want to cap tube at this time and prefers to leave it to gravity drainage. Patient has concerns she may \"get sick again\". Discussed with Dr. Torres interventional radiology and she agrees that it is ok to leave drain to gravity drainage. Patient's son verbalizes an understanding in plan of care and will call with questions or concerns.  "

## 2017-06-30 NOTE — PROGRESS NOTES
Pt to Radiology today for a dressing change around her gallbladder tube. Old dressings removed and replaced. There was no evidence of infection as the area was not warn to touch, had no redness or drainage from around the tube site. Pt left in satisfactory condition.

## 2017-08-13 NOTE — ED AVS SNAPSHOT
[facility] Emergency Department    [address]    [facility] [address]    Phone:  [phone]    Fax:  [phone]                                       Pamela Garcia   MRN: [id number]    Department:  [facility] Emergency Department   Date of Visit:  8/13/2017           After Visit Summary Signature Page     I have received my discharge instructions, and my questions have been answered. I have discussed any challenges I see with this plan with the nurse or doctor.    ..........................................................................................................................................  Patient/Patient Representative Signature      ..........................................................................................................................................  Patient Representative Print Name and Relationship to Patient    ..................................................               ................................................  Date                                            Time    ..........................................................................................................................................  Reviewed by Signature/Title    ...................................................              ..............................................  Date                                                            Time

## 2017-08-13 NOTE — ED AVS SNAPSHOT
[facility] Emergency Department    [address] [place] [address]    Phone:  [phone]    Fax:  [phone]                                       Pamela Garcia   MRN: [id number]    Department:  [facility] Emergency Department   Date of Visit:  8/13/2017           Patient Information     Date Of Birth          12/18/1925        Your diagnoses for this visit were:     Soft tissue infection     Urinary tract infection without hematuria, site unspecified        You were seen by Brian Matias MD.      Follow-up Information     Follow up with Lemuel Riggs Schedule an appointment as soon as possible for a visit in 3 days.    Contact information:    [facility] [place]  [address] [place] [id number] [phone]          Discharge Instructions       PLEASE STOP THE NITROFURANTOIN/MACROBID AS WE WILL PLACE YOU ON A DIFFERENT ANTIBIOTIC.    Discharge Instructions  Urinary Tract Infection  You or your child have been diagnosed with a urinary tract infection, or UTI. The urinary tract includes the kidneys (which make urine/pee), ureters (the tubes that carry urine/pee from the kidneys to the bladder), the bladder (which stores urine/pee), and urethra (the tube that carries urine/pee out of the bladder). Urinary tract infections occur when bacteria travel up the urethra into the bladder (bladder infection) and, in some cases, from there into the kidneys (kidney infection).  Generally, every Emergency Department visit should have a follow-up clinic visit with either a primary or a specialty clinic/provider. Please follow-up as instructed by your emergency provider today.  Return to the Emergency Department if:    You or your child have severe back pain.    You or your child are vomiting (throwing up) so that you cannot take your medicine.    You or your child have a new fever (had not previously had a fever) over 101 F.    You or your child have confusion or are  very weak, or feel very ill.    Your child seems much more ill, will not wake up, will not respond right, or is crying for a long time and will not calm down.    You or your child are showing signs of dehydration. These signs may include decreased urination (pee), dry mouth/gums/tongue, or decreased activity.    Follow-up with your provider:     Children under 24 months need to be seen by their regular provider within one week after a diagnosis of a UTI. It may be necessary to do some more tests to look at the child s kidney or bladder.    You should begin to feel better within 24 - 48 hours of starting your antibiotic; follow-up with your regular clinic/doctor/provider if this is not the case.    Treatment:     You will be treated with an antibiotic to kill the bacteria. We have to make an educated guess, based on what we know about common bacteria and antibiotics, as to which antibiotic will work for your infection. We will be correct most times but there will be some cases where the antibiotic chosen is not correct (see urine cultures below).    Take a pain medication such as acetaminophen (Tylenol ) or ibuprofen (Advil , Motrin , Nuprin ).    Phenazopyridine (Pyridium , Uristat ) is a prescription medication that numbs the bladder to reduce the burning pain of some UTIs.  The same medication is available in a non-prescription version (Azo-Standard , Urodol ). This medication will change the color of the urine and tears (usually blue or orange). If you wear contacts, do not wear them while taking this medication as they may be stained by the medication.    Urine Cultures:    If indicated, a urine culture may have been performed today. This test generally takes 24-48 hours to complete so the results are not known at this time. The results can confirm that an infection is present but also determine which antibiotic is effective for the specific bacteria that is causing the infection. If your urine culture shows  "that the antibiotic you were given today will not work to treat your infection, we will attempt to contact you to make arrangements to change the antibiotic. If the culture confirms that the antibiotic is effective for your infection, you will not be contacted. We often recommend follow-up with your regular physician/provider on the culture results regardless of this process.    Antibiotic Warning:     If you have been placed on antibiotics - watch for signs of allergic reaction.  These include rash, lip swelling, difficulty breathing, wheezing, and dizziness.  If you develop any of these symptoms, stop the antibiotic immediately and go to an emergency room or urgent care for evaluation.    Probiotics: If you have been given an antibiotic, you may want to also take a probiotic pill or eat yogurt with live cultures. Probiotics have \"good bacteria\" to help your intestines stay healthy. Studies have shown that probiotics help prevent diarrhea and other intestine problems (including C. diff infection) when you take antibiotics. You can buy these without a prescription in the pharmacy section of the store.   If you were given a prescription for medicine here today, be sure to read all of the information (including the package insert) that comes with your prescription.  This will include important information about the medicine, its side effects, and any warnings that you need to know about.  The pharmacist who fills the prescription can provide more information and answer questions you may have about the medicine.  If you have questions or concerns that the pharmacist cannot address, please call or return to the Emergency Department.   Remember that you can always come back to the Emergency Department if you are not able to see your regular provider in the amount of time listed above, if you get any new symptoms, or if there is anything that worries you.        Discharge References/Attachments     WOUND CHECK (INFECTION) " (ENGLISH)      24 Hour Appointment Hotline       To make an appointment at any [facility] (formerly [facility]), call [phone] ([phone]). If you don't have a family doctor or clinic, we will help you find one. [facility] clinics are conveniently located to serve the needs of you and your family.             Review of your medicines      START taking        Dose / Directions Last dose taken    cephALEXin 500 MG capsule   Commonly known as:  KEFLEX   Dose:  500 mg   Quantity:  21 capsule        Take 1 capsule (500 mg) by mouth 3 times daily for 7 days   Refills:  0          Our records show that you are taking the medicines listed below. If these are incorrect, please call your family doctor or clinic.        Dose / Directions Last dose taken    aspirin 81 MG tablet   Dose:  81 mg        Take 81 mg by mouth daily   Refills:  0        atorvastatin 40 MG tablet   Commonly known as:  LIPITOR   Dose:  40 mg        Take 40 mg by mouth daily   Refills:  0        calcium-vitamin D 600-400 MG-UNIT per tablet   Commonly known as:  CALTRATE   Dose:  1 tablet        Take 1 tablet by mouth 2 times daily   Refills:  0        CENTRUM SILVER per tablet   Dose:  1 tablet        Take 1 tablet by mouth daily   Refills:  0        clopidogrel 75 MG tablet   Commonly known as:  PLAVIX   Dose:  75 mg        Take 75 mg by mouth daily   Refills:  0        isosorbide mononitrate 60 MG 24 hr tablet   Commonly known as:  IMDUR   Dose:  60 mg        Take 60 mg by mouth daily   Refills:  0        levothyroxine 75 MCG tablet   Commonly known as:  SYNTHROID/LEVOTHROID   Dose:  75 mcg        Take 75 mcg by mouth daily   Refills:  0        metoprolol 25 MG tablet   Commonly known as:  LOPRESSOR   Dose:  25 mg        Take 25 mg by mouth 2 times daily   Refills:  0        NITROFURANTOIN MACROCRYSTAL PO   Dose:  100 mg        Take 100 mg by mouth   Refills:  0        NITROTAB SL   Dose:  0.4 mg        Place 0.4 mg under the tongue   Refills:  0         pantoprazole 20 MG EC tablet   Commonly known as:  PROTONIX   Dose:  20 mg        Take 20 mg by mouth daily   Refills:  0                Prescriptions were sent or printed at these locations (1 Prescription)                   Other Prescriptions                Printed at Department/Unit printer (1 of 1)         cephALEXin (KEFLEX) 500 MG capsule                Procedures and tests performed during your visit     Wound Culture Aerobic Bacterial      Orders Needing Specimen Collection     None      Pending Results     Date and Time Order Name Status Description    8/13/2017 2239 Wound Culture Aerobic Bacterial In process             Pending Culture Results     Date and Time Order Name Status Description    8/13/2017 2239 Wound Culture Aerobic Bacterial In process             Pending Results Instructions     If you had any lab results that were not finalized at the time of your Discharge, you can call the ED Lab Result RN at [phone]. You will be contacted by this team for any positive Lab results or changes in treatment. The nurses are available 7 days a week from 10A to 6:30P.  You can leave a message 24 hours per day and they will return your call.        Test Results From Your Hospital Stay        8/13/2017 10:50 PM                Clinical Quality Measure: Blood Pressure Screening     Your blood pressure was checked while you were in the emergency department today. The last reading we obtained was  BP: 172/82 . Please read the guidelines below about what these numbers mean and what you should do about them.  If your systolic blood pressure (the top number) is less than 120 and your diastolic blood pressure (the bottom number) is less than 80, then your blood pressure is normal. There is nothing more that you need to do about it.  If your systolic blood pressure (the top number) is 120-139 or your diastolic blood pressure (the bottom number) is 80-89, your blood pressure may be higher than it should be. You  "should have your blood pressure rechecked within a year by a primary care provider.  If your systolic blood pressure (the top number) is 140 or greater or your diastolic blood pressure (the bottom number) is 90 or greater, you may have high blood pressure. High blood pressure is treatable, but if left untreated over time it can put you at risk for heart attack, stroke, or kidney failure. You should have your blood pressure rechecked by a primary care provider within the next 4 weeks.  If your provider in the emergency department today gave you specific instructions to follow-up with your doctor or provider even sooner than that, you should follow that instruction and not wait for up to 4 weeks for your follow-up visit.        Thank you for choosing [facility]       Thank you for choosing [facility] for your care. Our goal is always to provide you with excellent care. Hearing back from our patients is one way we can continue to improve our services. Please take a few minutes to complete the written survey that you may receive in the mail after you visit with us. Thank you!        [employer] Information     [employer] lets you send messages to your doctor, view your test results, renew your prescriptions, schedule appointments and more. To sign up, go to [email or web address] . Click on \"Log in\" on the left side of the screen, which will take you to the Welcome page. Then click on \"Sign up Now\" on the right side of the page.     You will be asked to enter the access code listed below, as well as some personal information. Please follow the directions to create your username and password.     Your access code is: [email or web address]  Expires: 2017 11:50 AM     Your access code will  in 90 days. If you need help or a new code, please call your [facility] clinic or [phone].        Care EveryWhere ID     This is your Care EveryWhere ID. This could be used by other organizations to access your [facility] medical " records  [id number]        Equal Access to Services     JATINDER PALENCIA : Ronal Walters, king burrows, yamila linder, mike zambrano. So [facility] [phone].    ATENCIÓN: Si habla español, tiene a ho disposición servicios gratuitos de asistencia lingüística. Llame al [phone].    We comply with applicable federal civil rights laws and [place] laws. We do not discriminate on the basis of race, color, national origin, age, disability sex, sexual orientation or gender identity.            After Visit Summary       This is your record. Keep this with you and show to your community pharmacist(s) and doctor(s) at your next visit.

## 2017-08-14 NOTE — ED PROVIDER NOTES
History     Chief Complaint:  Wound check     HPI   Pamela Garcia is a 89 year old female who presents with her children out of concerns for skin infection and urinary tract infection. Patient has a history of cholecystitis for which she was admitted to the hospital in January 2017. Because of her other chronic medical conditions and clinical state, a percutaneous cholecystostomy tube was placed by interventional radiology. This has remained in place since January. The plan is to maintain this tube until November 2017 and once off Plavix, will have the drain removed. She has been doing well in regards this with no specific complaints. She developed urinary symptoms of dysuria 1 day ago and was seen at the [facility] with a urinalysis consistent with infection and placed on nitrofurantoin. Her urinary symptoms have improved but noticed that the drainage from the cholecystostomy tube appeared a deep brown or slightly reddish hue. There is no increase in the amount of draining she was having. She otherwise been doing well with exception of mild yellow discharge at the stoma site of this tube. She is without fevers, vomiting or any other concerns..    Allergies:  NKDA     Medications:      NITROFURANTOIN MACROCRYSTAL PO   cephALEXin (KEFLEX) 500 MG capsule   calcium-vitamin D (CALTRATE) 600-400 MG-UNIT per tablet   Multiple Vitamins-Minerals (CENTRUM SILVER) per tablet   Nitroglycerin (NITROTAB SL)   aspirin 81 MG tablet   atorvastatin (LIPITOR) 40 MG tablet   clopidogrel (PLAVIX) 75 MG tablet   isosorbide mononitrate (IMDUR) 60 MG 24 hr tablet   levothyroxine (SYNTHROID/LEVOTHROID) 75 MCG tablet   metoprolol (LOPRESSOR) 25 MG tablet   pantoprazole (PROTONIX) 20 MG EC tablet     Past Medical History:    Past Medical History:   Diagnosis Date     Arthritis      GI bleed      H/O heart artery stent      Thyroid disease      Past Surgical History:    Coronary stent    Family History:    family history  is not on file.    Social History:   reports that she has never smoked. She has never used smokeless tobacco.    PCP: Lemuel Riggs     Review of Systems   Constitutional: Negative for chills and fever.   Respiratory: Negative for shortness of breath.    Cardiovascular: Negative for chest pain.   Gastrointestinal: Negative for vomiting.   All other systems reviewed and are negative.      Physical Exam   Patient Vitals for the past 24 hrs:   BP Temp Temp src Pulse Heart Rate Resp SpO2   17 2216 172/82 - - - - - -   17 2206 - 97.9  F (36.6  C) Oral 83 83 18 94 %        Physical Exam   General:   Pleasant, age appropriate.  Eyes:    Conjunctiva normal, PERRL  Neck:    Supple, no meningismus.     CV:     Regular rate and rhythm.      No murmurs, rubs or gallops.       No  lower extremity edema.  PULM:    Clear to auscultation bilateral.       No respiratory distress.      Good air exchange.  ABD:    Soft, non-tender, non-distended.       No rebound, guarding or rigidity.     Cholecystostomy tube present at the upper abdomen      Tube draining well to collection reservoir      At stoma site mild purulent yellow drainage      No surrounding erythema, warmth or tenderness  MSK:     No gross deformity to all four extremities.   LYMPH:   No cervical lymphadenopathy.  NEURO:   Alert, good muscular tone, no atrophy.   Skin:    Warm, dry and intact.    Psych:    Mood is good and affect is appropriate.      Emergency Department Course     Laboratory:  Wound culture pending    Emergency Department Course:  Past medical records, nursing notes, and vitals reviewed.  I performed an exam of the patient and obtained history, as documented above.    Findings and plan explained to the Patient and children. Patient was discharged home.    Impression & Plan      Medical Decision Makin-year-old female with history of cholecystostomy tube placement presents the ED with concerns of discolored drainage from the collection  reservoir, mild discharge at the stoma site and recent UTI. In regards to the mild discharge, there is some purulent material without evidence of felix cellulitis or abscess. Wound culture sent. Patient placed on cephalexin. There is no concerns about the discoloration of the drainage from the cholecystostomy tube. She did have recent UTI and was placed on Macrobid. I explained to the family members that this may be less than an ideal choice given her advanced age. She will be transitioned to cephalexin for the cholecystostomy tube site infection which should also cover her for urinary tract infection. She is safe for discharge home and follow up with primary care physician.    Diagnosis:    ICD-10-CM    1. Soft tissue infection L08.9 Wound Culture Aerobic Bacterial   2. Urinary tract infection without hematuria, site unspecified N39.0         Discharge Medications:  Discharge Medication List as of 8/13/2017 10:57 PM      START taking these medications    Details   cephALEXin (KEFLEX) 500 MG capsule Take 1 capsule (500 mg) by mouth 3 times daily for 7 days, Disp-21 capsule, R-0, Local Print              8/14/2017   Brian Sandoval MD  08/14/17 0059

## 2017-08-14 NOTE — DISCHARGE INSTRUCTIONS
PLEASE STOP THE NITROFURANTOIN/MACROBID AS WE WILL PLACE YOU ON A DIFFERENT ANTIBIOTIC.    Discharge Instructions  Urinary Tract Infection  You or your child have been diagnosed with a urinary tract infection, or UTI. The urinary tract includes the kidneys (which make urine/pee), ureters (the tubes that carry urine/pee from the kidneys to the bladder), the bladder (which stores urine/pee), and urethra (the tube that carries urine/pee out of the bladder). Urinary tract infections occur when bacteria travel up the urethra into the bladder (bladder infection) and, in some cases, from there into the kidneys (kidney infection).  Generally, every Emergency Department visit should have a follow-up clinic visit with either a primary or a specialty clinic/provider. Please follow-up as instructed by your emergency provider today.  Return to the Emergency Department if:    You or your child have severe back pain.    You or your child are vomiting (throwing up) so that you cannot take your medicine.    You or your child have a new fever (had not previously had a fever) over 101 F.    You or your child have confusion or are very weak, or feel very ill.    Your child seems much more ill, will not wake up, will not respond right, or is crying for a long time and will not calm down.    You or your child are showing signs of dehydration. These signs may include decreased urination (pee), dry mouth/gums/tongue, or decreased activity.    Follow-up with your provider:     Children under 24 months need to be seen by their regular provider within one week after a diagnosis of a UTI. It may be necessary to do some more tests to look at the child s kidney or bladder.    You should begin to feel better within 24 - 48 hours of starting your antibiotic; follow-up with your regular clinic/doctor/provider if this is not the case.    Treatment:     You will be treated with an antibiotic to kill the bacteria. We have to make an educated guess,  based on what we know about common bacteria and antibiotics, as to which antibiotic will work for your infection. We will be correct most times but there will be some cases where the antibiotic chosen is not correct (see urine cultures below).    Take a pain medication such as acetaminophen (Tylenol ) or ibuprofen (Advil , Motrin , Nuprin ).    Phenazopyridine (Pyridium , Uristat ) is a prescription medication that numbs the bladder to reduce the burning pain of some UTIs.  The same medication is available in a non-prescription version (Azo-Standard , Urodol ). This medication will change the color of the urine and tears (usually blue or orange). If you wear contacts, do not wear them while taking this medication as they may be stained by the medication.    Urine Cultures:    If indicated, a urine culture may have been performed today. This test generally takes 24-48 hours to complete so the results are not known at this time. The results can confirm that an infection is present but also determine which antibiotic is effective for the specific bacteria that is causing the infection. If your urine culture shows that the antibiotic you were given today will not work to treat your infection, we will attempt to contact you to make arrangements to change the antibiotic. If the culture confirms that the antibiotic is effective for your infection, you will not be contacted. We often recommend follow-up with your regular physician/provider on the culture results regardless of this process.    Antibiotic Warning:     If you have been placed on antibiotics - watch for signs of allergic reaction.  These include rash, lip swelling, difficulty breathing, wheezing, and dizziness.  If you develop any of these symptoms, stop the antibiotic immediately and go to an emergency room or urgent care for evaluation.    Probiotics: If you have been given an antibiotic, you may want to also take a probiotic pill or eat yogurt with live  "cultures. Probiotics have \"good bacteria\" to help your intestines stay healthy. Studies have shown that probiotics help prevent diarrhea and other intestine problems (including C. diff infection) when you take antibiotics. You can buy these without a prescription in the pharmacy section of the store.   If you were given a prescription for medicine here today, be sure to read all of the information (including the package insert) that comes with your prescription.  This will include important information about the medicine, its side effects, and any warnings that you need to know about.  The pharmacist who fills the prescription can provide more information and answer questions you may have about the medicine.  If you have questions or concerns that the pharmacist cannot address, please call or return to the Emergency Department.   Remember that you can always come back to the Emergency Department if you are not able to see your regular provider in the amount of time listed above, if you get any new symptoms, or if there is anything that worries you.      "

## 2017-08-14 NOTE — ED NOTES
"Pt arrives for evaluation of \"strange color discharge\" coming from gallbladder drain. Pt states she had her dressing changed on Friday, and \"the nurse noticed there was drainage coming from the insertion site\". Pt was also diagnosed with UTI yesterday and started on antibiotic. Pt alert and oriented, ABCs intact.   "

## 2017-08-16 NOTE — TELEPHONE ENCOUNTER
[facility] Emergency Department Lab result notification:    [facility] lab result protocol used  General Culture    Reason for call  Notify of lab results, assess symptoms,  review ED providers recommendations/discharge instructions (if necessary) and advise per ED lab result f/u protocol    Lab Result  Final Wound culture report on 8/16/17  [facility] ED discharge antibiotic: Cephalexin (Keflex) 500 mg capsule, Take 1 capsule (500 mg) by mouth 3 times daily for 7 days  #1. Bacteria, Light growth Escherichia coli, which is [SUSCEPTIBLE] to ED discharge antibiotic  Incision and Drainage performed in [facility] [Yes / No]: No  Patient to be notified of result, symptoms's assessed and advised per [facility] lab result protocol.    Information table from ED Provider visit on 8/13/17  Symptoms reported at ED visit (Chief complaint, HPI) Pamela Garcia is a 89 year old female who presents with her children out of concerns for skin infection and urinary tract infection. Patient has a history of cholecystitis for which she was admitted to the hospital in January 2017. Because of her other chronic medical conditions and clinical state, a percutaneous cholecystostomy tube was placed by interventional radiology. This has remained in place since January. The plan is to maintain this tube until November 2017 and once off Plavix, will have the drain removed. She has been doing well in regards this with no specific complaints. She developed urinary symptoms of dysuria 1 day ago and was seen at the [facility] with a urinalysis consistent with infection and placed on nitrofurantoin. Her urinary symptoms have improved but noticed that the drainage from the cholecystostomy tube appeared a deep brown or slightly reddish hue. There is no increase in the amount of draining she was having. She otherwise been doing well with exception of mild yellow discharge at the stoma site of this tube. She is without  "fevers, vomiting or any other concerns..      ED providers Impression and Plan (applicable information) 89-year-old female with history of cholecystostomy tube placement presents the ED with concerns of discolored drainage from the collection reservoir, mild discharge at the stoma site and recent UTI. In regards to the mild discharge, there is some purulent material without evidence of felix cellulitis or abscess. Wound culture sent. Patient placed on cephalexin. There is no concerns about the discoloration of the drainage from the cholecystostomy tube. She did have recent UTI and was placed on Macrobid. I explained to the family members that this may be less than an ideal choice given her advanced age. She will be transitioned to cephalexin for the cholecystostomy tube site infection which should also cover her for urinary tract infection. She is safe for discharge home and follow up with primary care physician.   Miscellaneous information N/A     RN Assessment (Patient s current Symptoms), include time called.  [Insert Left message here if message left]  Pamela reports she \"pretty good but is a little tired\".  Decreased fluid output from around tube.  Urinary symptoms have also improved.      Please Contact your PCP clinic or return to the Emergency department if your:    Symptoms return.    Symptoms do not resolve after completing antibiotic.    Symptoms worsen or other concerning symptom's.    PCP follow-up Questions asked: NO    Jennifer Bustillos RN    [facility] RN  Lung Nodule and ED Lab Results F/U RN  Epic pool (ED late result f/u RN) : P [id number]   # [phone]    Copy of Lab result   Order   Wound Culture Aerobic Bacterial [[id number]] (Order [id number])   Exam Information   Exam Date Exam Time Accession # Results    8/13/17 10:35 PM [id number]    Component Results   Component Collected Lab   Specimen Description 08/13/2017 10:35    Other   Culture Micro (Abnormal) 08/13/2017 10:35 PM " 225   Light growth   Escherichia coli      Culture Micro 08/13/2017 10:35    Moderate growth   Normal skin soto      Order   STEPHEN [[id number]] (Order [id number])   Exam Information   Exam Date Exam Time Accession # Results    8/13/17 10:35 PM [id number]    Component Results   Component Value Flag Ref Range Units Status Collected Lab   Amikacin <=2 (S)  ug/mL Final 08/13/2017 10:35    Ampicillin 8 (S)  ug/mL Final 08/13/2017 10:35    Ampicillin/Sulbactam 4 (S)  ug/mL Final 08/13/2017 10:35    Cefazolin <=4 (S)  ug/mL Final 08/13/2017 10:35    Cefepime <=1 (S)  ug/mL Final 08/13/2017 10:35    Ceftazidime <=1 (S)  ug/mL Final 08/13/2017 10:35    Ceftriaxone <=1 (S)  ug/mL Final 08/13/2017 10:35    Ciprofloxacin <=0.25 (S)  ug/mL Final 08/13/2017 10:35    Gentamicin <=1 (S)  ug/mL Final 08/13/2017 10:35    Levofloxacin <=0.12 (S)  ug/mL Final 08/13/2017 10:35    Piperacillin/Tazo <=4 (S)  ug/mL Final 08/13/2017 10:35    Tobramycin <=1 (S)  ug/mL Final 08/13/2017 10:35    Trimethoprim/Sulfa <=1/19 (S)  ug/mL Final 08/13/2017 10:35    Meropenem <=0.25 (S)  ug/mL Final 08/13/2017 10:35    Ext Spect B Lac Prod Negative (S)   Final 08/13/2017 10:35

## 2017-08-24 NOTE — PROGRESS NOTES
Patient here for gallbladder site dressing change. Redness and small amount of foul drainage at site, assessed by Dr. Cuello who felt it was adhesive dermatitis. Recommended gauze dressings with paper tape changed daily along with bacitracin to site. Son verbalizes an understanding of instructions. Patient discharged to home via wheelchair with son in stable condition.

## 2017-09-15 NOTE — PROGRESS NOTES
Pt was here today because her drain fell out 9/14/17. [facility] was called today 9/15/17 to report it. Pt states that she reinserted the tube after it fell out. Upon exam, the tube was in the skin opening but there was some foul smelling drainage on the dressing. Other than that the site looked good and there was no redness noted. Pt reports feeling fine, no reported fever or chills. Dr. Brown attempted to reinsert a drain but when he got into the tract and injected he was injecting into the colon. CT scan was performed and the results were discussed between Dr Cain (Mike is off today)  and Dr Brown. The plan at this point is to keep the tube out and see how she does. We will follow up with her early next week and see how she is feeling then get Dr Mckeon up to date and devise a future plan. Pt and family understood the plan and they will watcch her closely over the next few days. Site was cleaned, bacitracin, gauze and tegaderm applied.

## 2017-09-19 NOTE — TELEPHONE ENCOUNTER
Spoke with patient's daughter Ninoska to see how her mom was doing without her gallbladder tube, according to her daughter she is doing great with no pain and no symptoms. Will follow up with Dr. Romeo on Friday as he requested for plan of care.

## 2017-11-17 PROBLEM — L03.90 CELLULITIS: Status: ACTIVE | Noted: 2017-01-01

## 2017-11-17 NOTE — ED NOTES
Pt presents to ED for evaluation of abdominal pain. Pt had a RUQ US done today at [facility]. Pt had a gallbladder tube, but it fell out about a month ago. Today, the tube site is now draining, swollen, and reddened. Pt stopped plavix on Nov 6th. Denies fever and vomiting. ABCDs intact.

## 2017-11-17 NOTE — ED PROVIDER NOTES
History     Chief Complaint:  Abdominal Pain    HPI   Pamela Garcia is a 89 year old female who presents with abdominal pain. The patient is brought into the ED today after being referred her by [facility]. The patient reports that she has had abdominal issues over the past few months and had undergone abdominal surgery to place a gallbladder tube, which accidentally fell out in September. The patient's family report that she visited Urgent Care this morning in response to developing recent abdominal pain at the former tube placement as well as some abdominal distention and redness. She had an ultrasound, which was determined to be inconclusive, and thus was sent here to receive a CT scan as well as receiving blood work and being administered a shot of 1g of ceftriaxone at 1050. The patient states that she currently feels fine and his having no significant abdominal pain. She denies any fevers, nausea, vomiting, urinary symptoms, or any other symptoms.    CBC WITH AUTO DIFFERENTIAL (11/17/2017 10:36 AM)  WHITE BLOOD COUNT  10.6   RED BLOOD COUNT  4.29  HEMOGLOBIN   11.1 (L)   HEMATOCRIT   34.8   MCV     81   MCH     25.9 (L)   MCHC    31.9 (L)   RDW     18.0 (H)   PLATELET COUNT   315   MPV     9.9     Right upper Quadrant Ultrasound 11/17/2017 3:12pm  IMPRESSION:  1. Tract from previous Cholecystectomy tube is seen extending from the skin surface deep into the abdominal cavity in the region of the gallbladder. Unclear whether this tract communicates within the gallbladder. No definite masses or filling defects seen within this tract although evaluation by ultrasound is limited. Mild hyperemia around the tract near the skin surface could represent inflammation or possibly cellulitis.   2. Cholelithiasis  3. Common bile duct mildly enlarged measuring up to 7 mm     Allergies:  No known drug allergies.    Medications:    NITROFURANTOIN MACROCRYSTAL PO  calcium-vitamin D (CALTRATE) 600-400  MG-UNIT per tablet  Multiple Vitamins-Minerals (CENTRUM SILVER) per tablet  Nitroglycerin (NITROTAB SL)  aspirin 81 MG tablet  atorvastatin (LIPITOR) 40 MG tablet  isosorbide mononitrate (IMDUR) 60 MG 24 hr tablet  levothyroxine (SYNTHROID/LEVOTHROID) 75 MCG tablet  metoprolol (LOPRESSOR) 25 MG tablet  pantoprazole (PROTONIX) 20 MG EC tablet     Past Medical History:    Arthritis  GI bleed  Heart artery stent  Thyroid disease    Past Surgical History:    Cardiac stents  GYN surgery    Family History:    No pertinent family history.    Social History:  Smoking status: Never smoker  Alcohol use: No  Marital Status:        Review of Systems   Constitutional: Negative for appetite change, chills, fatigue and fever.   Respiratory: Negative for cough and shortness of breath.    Cardiovascular: Negative for chest pain.   Gastrointestinal: Positive for abdominal pain. Negative for anal bleeding, blood in stool, diarrhea, nausea and vomiting.   Genitourinary: Negative for difficulty urinating, dysuria, hematuria, pelvic pain, vaginal bleeding and vaginal discharge.   Musculoskeletal: Negative for back pain and gait problem.   Skin: Positive for color change. Negative for rash and wound.   Neurological: Negative for dizziness, weakness, light-headedness, numbness and headaches.   All other systems reviewed and are negative.      Physical Exam     Patient Vitals for the past 24 hrs:   BP Temp Temp src Pulse Heart Rate Resp SpO2   11/17/17 1830 172/78 - - - 75 16 96 %   11/17/17 1800 163/72 - - - 74 16 97 %   11/17/17 1548 144/86 97.9  F (36.6  C) Temporal 71 - 16 97 %       Physical Exam    Nursing note and vitals reviewed.    Constitutional: Pleasant and well groomed.          HENT:    Mouth/Throat: Oropharynx is without swelling or erythema. Oral mucosa moist.    Eyes: Conjunctivae are normal. No scleral icterus.    Neck: Neck supple.   Cardiovascular: Normal rate, regular rhythm and intact distal pulses.     Pulmonary/Chest: Effort normal and breath sounds normal.   Abdominal: Soft.  RUQ tenderness. Former percutaneous tube site in RUQ that is protruding with surrounding area of erythema of [id number]. No drainage. Small amount of blood on band aid.   Musculoskeletal:  No edema, No calf tenderness  Neurological:Alert and answering questions appropriately. Coordination normal.   Skin: Skin is warm and dry.   Psychiatric: Normal mood and affect.     Emergency Department Course   Imaging:  Abdomen/Pelvis CT with IV contrast:  IMPRESSION:  1. Stranding in the region the previous cholecystostomy tube tract is  similar to previous. No acute process demonstrated in the abdomen and  pelvis.  2. Unchanged cholelithiasis without cholecystitis.  3. 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  Report per radiology.      Laboratory:  CBC:  WBC 10.9, HGB 10.9 (L), ,   CMP: Glucose 117 (H), Albumin 2.9 (L), otherwise WNL (Creatinine 0.75)  (1748) Lipase: 43 (L)  (1753) Creatinine POCT: creatinine 0.7, GFR 78     Interventions:  (1947) Normal Saline, 1 liter, IV bolus  (2121) Zosyn 3.375 g IV Infusion    Emergency Department Course:  Nursing notes and vitals reviewed.  (1729) I performed an exam of the patient as documented above.    Blood was drawn from the patient. This was sent for laboratory testing, findings above.   The patient was sent for a CT scan while in the emergency department, findings above.     (2035) I spoke with Dr. Tanner of general surgery about the patient and asked for a consultation on whether or not the patient would require surgery.     (2100) I revisited with the patient in their room to discuss results and that the patient would require admission.     Findings and plan explained to the patient who consents to admission.   (2120) I discussed the patient with Dr. Lu of the hospitalist service, who will admit the patient to a med/surg bed for further monitoring, evaluation, and treatment.    Impression & Plan    Medical Decision Making:  Pamela Garcia is a 89 year old female who was transferred in from outside facility due to change in the appearance of her previous percutaneous drain. She denied systemic symptoms or  symptoms. On exam there is a small amount of bleeding form the site and cellulitis as described above without significant abdominal tenderness. Labs were reassuring as noted above. Ultrasound results from the outside facility were reviewed and CT scan was obtained with results as noted above. I consulted with Dr. Tanner who was on call for general surgery, he recommended Zosyn and admission for further evaluation as to the source of the CT findings be it deeper space infection versus fistula. I spoke with Dr. Lu who accepted ongoing care of the patient. She remained hemodynamically stable during her time in the ED.     Diagnosis:    ICD-10-CM   1. Cellulitis of abdominal wall with possible deep extension L03.311       Disposition:  Patient is admitted to a med/surg bed under the care of Dr. Lu.        I, Anton Hough, am serving as a scribe on 11/17/2017 at 5:29 PM to personally document services performed by Dr. Terri Jenkins based on my observations and the provider's statements to me.      Anton Hough  11/17/2017   [facility] EMERGENCY DEPARTMENT       Terri Jenkins MD  11/18/17 0006    Addendum to correct error above  I was not on call for general surgery on 11/17. This was discussed with Dr. Carpio, and I was not notified of the consult until earlier this morning 11/18/2017. MD Alice Hale Christine Abbott, MD  11/20/17 0326

## 2017-11-17 NOTE — IP AVS SNAPSHOT
[facility]    [address]    [facility] [address]    Phone:  [phone]    Fax:  [phone]                                       After Visit Summary   11/17/2017    Pamela Garcia    MRN: [id number]           After Visit Summary Signature Page     I have received my discharge instructions, and my questions have been answered. I have discussed any challenges I see with this plan with the nurse or doctor.    ..........................................................................................................................................  Patient/Patient Representative Signature      ..........................................................................................................................................  Patient Representative Print Name and Relationship to Patient    ..................................................               ................................................  Date                                            Time    ..........................................................................................................................................  Reviewed by Signature/Title    ...................................................              ..............................................  Date                                                            Time

## 2017-11-17 NOTE — IP AVS SNAPSHOT
MRN:[id number]                      After Visit Summary   11/17/2017    Pamela Garcia    MRN: [id number]           Thank you!     Thank you for choosing [facility] for your care. Our goal is always to provide you with excellent care. Hearing back from our patients is one way we can continue to improve our services. Please take a few minutes to complete the written survey that you may receive in the mail after you visit. If you would like to speak to someone directly about your visit please contact Patient Relations at [phone]. Thank you!          Patient Information     Date Of Birth          12/18/1925        Designated Caregiver       Most Recent Value    Caregiver    Will someone help with your care after discharge? no      About your hospital stay     You were admitted on:  November 17, 2017 You last received care in the:  [place] [address] [facility]    You were discharged on:  November 18, 2017        Reason for your hospital stay       Abdominal wall cellulitis                  Who to Call     For medical emergencies, please call 911.  For non-urgent questions about your medical care, please call your primary care provider or clinic, [phone]          Attending Provider     Provider Specialty    Lum, Lizbet Rodriguez MD Emergency Medicine    Alice, Terri Campoverde MD Emergency Medicine    Aly, Minerva Acevedo MD Internal Medicine       Primary Care Provider Office Phone # Fax #    Lemuel Riggs [phone]      After Care Instructions     Discharge Instructions       Take antibiotics as prescribed, use probiotics (tablets) or eat yoghurt to prevent side effects of antibiotics such as C diff diarrhea                  Follow-up Appointments     Follow-up and recommended labs and tests        Follow up with surgeon in 1-2 weeks                  Pending Results     No orders found for last 3 day(s).            Statement of Approval     Ordered  "         17 2676  I have reviewed and agree with all the recommendations and orders detailed in this document.  EFFECTIVE NOW     Approved and electronically signed by:  Malika Tillman MD             Admission Information     Date & Time Provider Department Dept. Phone    2017 Minerva Lu MD [place] [address] [facility] [phone]      Your Vitals Were     Blood Pressure Pulse Temperature Respirations Height Weight    158/67 (BP Location: Left arm) 70 97.8  F (36.6  C) (Oral) 24 1.448 m (4' 9\") 66.4 kg (146 lb 6.4 oz)    Pulse Oximetry BMI (Body Mass Index)                94% 31.68 kg/m2          MyChart Information     [employer] lets you send messages to your doctor, view your test results, renew your prescriptions, schedule appointments and more. To sign up, go to [email or web address] . Click on \"Log in\" on the left side of the screen, which will take you to the Welcome page. Then click on \"Sign up Now\" on the right side of the page.     You will be asked to enter the access code listed below, as well as some personal information. Please follow the directions to create your username and password.     Your access code is: [email or web address]  Expires: 2018  1:45 PM     Your access code will  in 90 days. If you need help or a new code, please call your [facility] or [phone].        Care EveryWhere ID     This is your Care EveryWhere ID. This could be used by other organizations to access your [facility] medical records  [id number]        Equal Access to Services     [facility] : Hadgrant Walters, king burrows, mike edward. So [facility] [phone].    ATENCIÓN: Si habla español, tiene a ho disposición servicios gratuitos de asistencia lingüística. Llame al [phone].    We comply with applicable federal civil rights laws and [place] laws. We do not discriminate on the basis of race, " color, national origin, age, disability, sex, sexual orientation, or gender identity.               Review of your medicines      START taking        Dose / Directions    4X PROBIOTIC Tabs   Used for:  Cellulitis of abdominal wall        Dose:  1 tablet   Take 1 tablet by mouth 4 times daily for 10 days   Quantity:  40 tablet   Refills:  0       amoxicillin-clavulanate 500-125 MG per tablet   Commonly known as:  AUGMENTIN   Used for:  Cellulitis of abdominal wall        Dose:  1 tablet   Take 1 tablet by mouth 2 times daily for 7 days   Quantity:  14 tablet   Refills:  0         CONTINUE these medicines which have NOT CHANGED        Dose / Directions    aspirin 81 MG tablet        Dose:  81 mg   Take 81 mg by mouth daily   Refills:  0       atorvastatin 40 MG tablet   Commonly known as:  LIPITOR        Dose:  40 mg   Take 40 mg by mouth every evening   Refills:  0       bismuth subsalicylate 262 MG chewable tablet   Commonly known as:  PEPTO BISMOL        Dose:  524 mg   Take 524 mg by mouth daily as needed   Refills:  0       calcium-vitamin D 600-400 MG-UNIT per tablet   Commonly known as:  CALTRATE        Dose:  1 tablet   Take 1 tablet by mouth daily   Refills:  0       * CENTRUM SILVER per tablet        Dose:  1 tablet   Take 1 tablet by mouth daily   Refills:  0       * PRESERVISION AREDS PO        Dose:  1 tablet   Take 1 tablet by mouth daily   Refills:  0       isosorbide mononitrate 30 MG 24 hr tablet   Commonly known as:  IMDUR        Dose:  30 mg   Take 30 mg by mouth daily   Refills:  0       levothyroxine 75 MCG tablet   Commonly known as:  SYNTHROID/LEVOTHROID        Dose:  75 mcg   Take 75 mcg by mouth daily   Refills:  0       metoprolol 25 MG tablet   Commonly known as:  LOPRESSOR        Dose:  25 mg   Take 25 mg by mouth 2 times daily   Refills:  0       NITROTAB SL        Dose:  0.4 mg   Place 0.4 mg under the tongue every 5 minutes as needed   Refills:  0       pantoprazole 20 MG EC tablet    Commonly known as:  PROTONIX        Dose:  20 mg   Take 20 mg by mouth daily   Refills:  0       TYLENOL PO        Dose:  325 mg   Take 325 mg by mouth every 8 hours as needed for mild pain or fever   Refills:  0       * Notice:  This list has 2 medication(s) that are the same as other medications prescribed for you. Read the directions carefully, and ask your doctor or other care provider to review them with you.         Where to get your medicines      These medications were sent to [id number] [address] [address]     Phone:  [phone]     4X PROBIOTIC Tabs    amoxicillin-clavulanate 500-125 MG per tablet               ANTIBIOTIC INSTRUCTION     You've Been Prescribed an Antibiotic - Now What?  Your healthcare team thinks that you or your loved one might have an infection. Some infections can be treated with antibiotics, which are powerful, life-saving drugs. Like all medications, antibiotics have side effects and should only be used when necessary. There are some important things you should know about your antibiotic treatment.      Your healthcare team may run tests before you start taking an antibiotic.    Your team may take samples (e.g., from your blood, urine or other areas) to run tests to look for bacteria. These test can be important to determine if you need an antibiotic at all and, if you do, which antibiotic will work best.      Within a few days, your healthcare team might change or even stop your antibiotic.    Your team may start you on an antibiotic while they are working to find out what is making you sick.    Your team might change your antibiotic because test results show that a different antibiotic would be better to treat your infection.    In some cases, once your team has more information, they learn that you do not need an antibiotic at all. They may find out that you don't have an infection, or that the  antibiotic you're taking won't work against your infection. For example, an infection caused by a virus can't be treated with antibiotics. Staying on an antibiotic when you don't need it is more likely to be harmful than helpful.      You may experience side effects from your antibiotic.    Like all medications, antibiotics have side effects. Some of these can be serious.    Let you healthcare team know if you have any known allergies when you are admitted to the hospital.    One significant side effect of nearly all antibiotics is the risk of severe and sometimes deadly diarrhea caused by Clostridium difficile (C. Difficile). This occurs when a person takes antibiotics because some good germs are destroyed. Antibiotic use allows C. diificile to take over, putting patients at high risk for this serious infection.    As a patient or caregiver, it is important to understand your or your loved one's antibiotic treatment. It is especially important for caregivers to speak up when patients can't speak for themselves. Here are some important questions to ask your healthcare team.    What infection is this antibiotic treating and how do you know I have that infection?    What side effects might occur from this antibiotic?    How long will I need to take this antibiotic?    Is it safe to take this antibiotic with other medications or supplements (e.g., vitamins) that I am taking?     Are there any special directions I need to know about taking this antibiotic? For example, should I take it with food?    How will I be monitored to know whether my infection is responding to the antibiotic?    What tests may help to make sure the right antibiotic is prescribed for me?      Information provided by:  www.cdc.gov/getsmart  U.S. Department of Health and Human Services  Centers for disease Control and Prevention  National Center for Emerging and Zoonotic Infectious Diseases  Division of Healthcare Quality Promotion         Protect  others around you: Learn how to safely use, store and throw away your medicines at www.disposemymeds.org.             Medication List: This is a list of all your medications and when to take them. Check marks below indicate your daily home schedule. Keep this list as a reference.      Medications           Morning Afternoon Evening Bedtime As Needed    4X PROBIOTIC Tabs   Take 1 tablet by mouth 4 times daily for 10 days                                amoxicillin-clavulanate 500-125 MG per tablet   Commonly known as:  AUGMENTIN   Take 1 tablet by mouth 2 times daily for 7 days                                aspirin 81 MG tablet   Take 81 mg by mouth daily                                atorvastatin 40 MG tablet   Commonly known as:  LIPITOR   Take 40 mg by mouth every evening   Last time this was given:  40 mg on 11/17/2017 10:55 PM                                bismuth subsalicylate 262 MG chewable tablet   Commonly known as:  PEPTO BISMOL   Take 524 mg by mouth daily as needed                                calcium-vitamin D 600-400 MG-UNIT per tablet   Commonly known as:  CALTRATE   Take 1 tablet by mouth daily                                * CENTRUM SILVER per tablet   Take 1 tablet by mouth daily   Last time this was given:  1 tablet on 11/18/2017  8:47 AM                                * PRESERVISION AREDS PO   Take 1 tablet by mouth daily   Last time this was given:  1 tablet on 11/18/2017  8:47 AM                                isosorbide mononitrate 30 MG 24 hr tablet   Commonly known as:  IMDUR   Take 30 mg by mouth daily   Last time this was given:  30 mg on 11/18/2017  8:47 AM                                levothyroxine 75 MCG tablet   Commonly known as:  SYNTHROID/LEVOTHROID   Take 75 mcg by mouth daily   Last time this was given:  75 mcg on 11/18/2017  6:17 AM                                metoprolol 25 MG tablet   Commonly known as:  LOPRESSOR   Take 25 mg by mouth 2 times daily   Last time this  was given:  25 mg on 11/18/2017  8:47 AM                                NITROTAB SL   Place 0.4 mg under the tongue every 5 minutes as needed                                pantoprazole 20 MG EC tablet   Commonly known as:  PROTONIX   Take 20 mg by mouth daily   Last time this was given:  20 mg on 11/17/2017 10:55 PM                                TYLENOL PO   Take 325 mg by mouth every 8 hours as needed for mild pain or fever                                * Notice:  This list has 2 medication(s) that are the same as other medications prescribed for you. Read the directions carefully, and ask your doctor or other care provider to review them with you.

## 2017-11-18 NOTE — PLAN OF CARE
Problem: Patient Care Overview  Goal: Plan of Care/Patient Progress Review    VSS. Denies pain. R abdomen redness improving. Dressing with small serosanguinous drainage. On IV zosyn. Tolerating low fat diet. Voiding. Await surgery consult. Will continue to monitor.

## 2017-11-18 NOTE — PLAN OF CARE
"Problem: Patient Care Overview  Goal: Plan of Care/Patient Progress Review  Patient's VS stable, A/O, afebrile, denies pain, PIV saline locked with IV zosyn Q6H. Wound to right abdomen from tube removal covered with vaseline gauze and dry gauze, changed during OVN; patient cued numerous times to leave dressing alone and continued to peel back to \"check the swelling\". Assist of 1 for mobility with use of walker at home, ambulated to bathroom x4 during overnight with little void each time, LS clear and equal, BS active, low fat/low sodium diet. Discharge TBD.      "

## 2017-11-18 NOTE — DISCHARGE SUMMARY
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    She dislodged the cholecystostomy tube 2 months ago tried to re-insert herself. It is suspected that a fistula has formed between GB and the colon due to possible perforation of colon from cholecystostomy tube. She may also have formed a cutanous track at the previous chris-tube site which now appeared infected. she had surrounding cellulitis which responded rapidly to IV zosyn.   Pt was evaluated by surgery and they did not recommend any further procedure based on her age and rapid response to abx.  Pt was dc home on PO Augmentin for treatment of cellulitis and will  "f/u with surgery as outpatient        Physical Exam Findings:  Blood pressure 158/67, pulse 70, temperature 97.8  F (36.6  C), temperature source Oral, resp. rate 24, height 1.448 m (4' 9\"), weight 66.4 kg (146 lb 6.4 oz), SpO2 94 %.  Wt Readings from Last 1 Encounters:   11/17/17 66.4 kg (146 lb 6.4 oz)   GENERAL: No apparent distress. Awake, alert, and fully oriented. Very pleasant   HEENT: Normocephalic, atraumatic. Extraocular movements intact.  CARDIOVASCULAR: Regular rate and rhythm without murmurs or rubs. No S3.  PULMONARY: Clear bilaterally.  ABDOMINAL: Soft, non-tender, non-distended. Bowel sounds normoactive. No hepatosplenomegaly.  EXTREMITIES: No cyanosis or clubbing. No edema.  NEUROLOGICAL: CN 2-12 grossly intact, no focal neurological deficits.  DERMATOLOGICAL:Skin in the mid abdomen at the site of the prior cholecystotomy tube there is a small bulge with some serosanguineous drainage and is surrounding redness just above the to the right there is an indurated area of firmness in the subcutaneous tissue about 2 cm with mild tenderness, much receeded from admission per border outlined   PSYCH: appropriate    Allergies:  No Known Allergies     Discharge Medications:   Current Discharge Medication List      START taking these medications    Details   amoxicillin-clavulanate (AUGMENTIN) 500-125 MG per tablet Take 1 tablet by mouth 2 times daily for 7 days  Qty: 14 tablet, Refills: 0    Associated Diagnoses: Cellulitis of abdominal wall      Probiotic Product (4X PROBIOTIC) TABS Take 1 tablet by mouth 4 times daily for 10 days  Qty: 40 tablet, Refills: 0    Associated Diagnoses: Cellulitis of abdominal wall         CONTINUE these medications which have NOT CHANGED    Details   isosorbide mononitrate (IMDUR) 30 MG 24 hr tablet Take 30 mg by mouth daily      !! Multiple Vitamins-Minerals (PRESERVISION AREDS PO) Take 1 tablet by mouth daily      Acetaminophen (TYLENOL PO) Take 325 mg by mouth every 8 hours " "as needed for mild pain or fever      bismuth subsalicylate (PEPTO BISMOL) 262 MG chewable tablet Take 524 mg by mouth daily as needed      calcium-vitamin D (CALTRATE) 600-400 MG-UNIT per tablet Take 1 tablet by mouth daily       !! Multiple Vitamins-Minerals (CENTRUM SILVER) per tablet Take 1 tablet by mouth daily      Nitroglycerin (NITROTAB SL) Place 0.4 mg under the tongue every 5 minutes as needed       aspirin 81 MG tablet Take 81 mg by mouth daily      atorvastatin (LIPITOR) 40 MG tablet Take 40 mg by mouth every evening       levothyroxine (SYNTHROID/LEVOTHROID) 75 MCG tablet Take 75 mcg by mouth daily      metoprolol (LOPRESSOR) 25 MG tablet Take 25 mg by mouth 2 times daily      pantoprazole (PROTONIX) 20 MG EC tablet Take 20 mg by mouth daily       !! - Potential duplicate medications found. Please discuss with provider.           Condition on Discharge:  Discharge condition: Stable   Discharge vitals: Blood pressure 158/67, pulse 70, temperature 97.8  F (36.6  C), temperature source Oral, resp. rate 24, height 1.448 m (4' 9\"), weight 66.4 kg (146 lb 6.4 oz), SpO2 94 %.   Code status on discharge: Full Code       Procedures other than Imaging:  None      Imaging:  Results for orders placed or performed during the hospital encounter of 11/17/17   CT Abdomen Pelvis w Contrast    Narrative    CT ABDOMEN AND PELVIS WITH CONTRAST 11/17/2017 7:56 PM     HISTORY: Abdominal pain, swelling.    COMPARISON: 9/15/2017    TECHNIQUE: Volumetric helical acquisition of CT images from the lung  bases through the symphysis pubis after the administration of 72mL  Isovue-370 intravenous contrast. Radiation dose for this scan was  reduced using automated exposure control, adjustment of the mA and/or  kV according to patient size, or iterative reconstruction technique.    FINDINGS: Stranding persists in the region of the previous  cholecystostomy tube. Cholelithiasis without definite cholecystitis  noted. No free air or free " fluid. Normal appendix. The liver, spleen,  adrenal glands, kidneys, and pancreas demonstrate no worrisome focal  lesion. Fatty atrophy of the pancreas noted. There are extensive  atherosclerotic changes of the visualized aorta and its branches.  There is no evidence of aortic dissection or aneurysm. There are no  dilated loops of small intestine or large bowel to suggest ileus or  obstruction. There is moderate diverticulosis without evidence for  diverticulitis. No adenopathy. No intra-abdominal abscess. Stable T12  compression deformity. No frankly destructive bony lesions. Mild  dilatation of the distal thoracic aorta at 3.2 cm.      Impression    IMPRESSION:  1. Stranding in the region the previous cholecystostomy tube tract is  similar to previous. No acute process demonstrated in the abdomen and  pelvis.  2. Unchanged cholelithiasis without cholecystitis.  3. 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    MARY ANN POWELL MD        Consultations:  Consultation during this admission received from surgery.     Recent Lab Results:    Recent Labs  Lab 11/18/17  0549 11/17/17  1748   WBC 7.5 10.9   HGB 10.7* 10.9*   HCT 34.0* 35.3   MCV 82 82    297       Recent Labs  Lab 11/18/17  0549 11/17/17  1753 11/17/17  1748     --  138   POTASSIUM 3.9  --  4.1   CHLORIDE 101  --  102   CO2 30  --  31   ANIONGAP 6  --  5   *  --  117*   BUN 18  --  20   CR 0.81  --  0.75   GFRESTIMATED 66 78 72   GFRESTBLACK 80 >90 87   JORDAN 8.3*  --  8.6          Pending Results:    Unresulted Labs Ordered in the Past 30 Days of this Admission     No orders found for last 61 day(s).           Discharge Instructions and Follow-Up:     Discharge Procedure Orders  Reason for your hospital stay   Order Comments: Abdominal wall cellulitis     Follow-up and recommended labs and tests    Order Comments: Follow up with surgeon in 1-2 weeks     Discharge Instructions   Order Comments: Take antibiotics as prescribed, use  probiotics (tablets) or eat yoghurt to prevent side effects of antibiotics such as C diff diarrhea     Full Code         Total time spent in face to face contact with the patient and coordinating discharge was:  50 Minutes.    Malika Tillman MD  Hospitalist  [facility] [address] [place] [address]

## 2017-11-18 NOTE — H&P
Chief complaint  New bloody drainage from prior healed site of a Cholocystotomy tube, removed 2 months ago and surrounding localized firmness and redness    Primary care [facility]    History of present illness      Pamela Garcia is a 89-year-old female with a history of non-ST inferior MI, one year ago and subsequent stenting of the right coronary artery and to areas in the left anterior descending, distant prior inferior MI and lower GI bleeding hypothyroidism hospitalization here 9 months ago for acute cholecystitis treated with a cholecystostomy tube and he now presents with new bloody drainage from the prior tube insertion site and surrounding redness and firmness. During the hospitalization revealed acute cholecystitis surgical resection was not done because the patient was on Plavix. The time of the cholecystotomy tube insertion and assistant was widely patent. A cholangiogram of the tube March 2017 showed proper placement and widely patent proximal including the cystic duct. 2 months ago the tube fell out and attempts by IR to replace the tube were unsuccessful. Catheter was unable to be advanced after meeting resistance contrast was injected and the catheter was found to be in the colon at the level of the hepatic lecture a subsequent CT scan there was no filling of the gallbladder lumen showed contrast within the colon there is no filling of the gallbladder lumen and a fistula formation was suspected to have formed between the gallbladder and the colon. After this the insertion site completely healed over. Then today the patient noticed some bloody serous drainage on her closed and noticed that this site had opened with a slight bulging surrounding this area with redness and there was an indurated area firmness. After being seen in urgent care with an altered sensation the patient was referred for ER admission. 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  8 hypothyroidism    Review of systems  Patient normally lives in [place] she is here to see her daughter's in [facility]'s play [facility] and has no shortness of breath or chest discomfort normal appetite no vomiting no diarrhea or constipation no fevers and no abdominal pain otherwise negative per 10 system review    Family history  Positive for coronary artery disease and hypertension    Social history  No smoking or drinking, lives in [place] is here visiting her daughters    Physical exam  Hard of hearing, does not want to be admitted  Son is visiting from [place]  HEENT exam is normal mucous membranes moist  172/78 82   16 no fevers  JVP normal carotid upstroke normal  Lungs clear throughout normal respirations no labored breathing  CV regular rhythm normal heart sounds no murmur  Abdomen soft nontender normal bowel sounds no pain  Skin in the mid abdomen at the site of the prior cholecystotomy tube there is a small bulge with some serosanguineous drainage and is surrounding overall 3 cm area of redness just above the to the right there is an indurated area of firmness in the subcutaneous tissue about 2 cm with mild tenderness   voiding without difficulty  Neurologic no focal weakness or sensory deficits  Musculoskeletal no joint inflammation  Extremities equal symmetrical pulses throughout and no edema  Psychologically coping well    Medications  Aspirin 81 mg daily  Lipitor 40 daily  Calcium vitamin D 1 daily  Imdur 30 mg  daily  Synthroid 75  g daily  Toprol 25 twice a day  Multivitamins  Protonix 20 mg daily    Results  CT stranding persist in the region of the previous cholecystostomy tube no abscess    CT 9/15/2017 showed contrast in the colon from [place] injection through the cholecystostomy tract a fistula between the track and the colon is suspected    Sodium 138 potassium 4.1 bicarbonate 31 creatinine 0.75 liver function test are normal white count 10.9 hemoglobin 10.9 platelets are 297    Impression         Pamela Garcia is a 89-year-old female with a history of non-ST inferior MI, one year ago and subsequent stenting of the right coronary artery and to areas in the left anterior descending, distant prior inferior MI and lower GI bleeding hypothyroidism hospitalization here nine months ago for acute cholecystitis treated with a cholecystostomy tube and he now presents with new bloody drainage from the prior tube insertion site and surrounding redness and firmness.      Pamela presents with new from a prior site of a coal leaves cystotomy tube that was removed 2 months ago there is also a surrounding area of induration and cellulitis A fistula has been suspected that has occurred between either the track and the colon Where the track in the gallbladder and colon. It is likely from this fistula     Surrounding cellulitis is likely from a spread of the fistula drainage from the cholecystostomy track.    Plan  1.Admit as an inpatient for IV antibiotics with Zosyn to cover the potential gastrointestinal organisms  2 Consult surgery to establish with a plan to treat the probable fistula  3 She is no longer on Plavix as she is a year out since her stent and can safely undergo any surgical intervention needed  4 Continue her coronary artery disease medications including indoor metoprolol and aspirin and Lipitor  5 The area of cellulitis is small so the radiation can be given to switching to oral antibiotics and she is very  interested in being discharged as soon as she could.

## 2017-11-18 NOTE — PROGRESS NOTES
Patient seen and examined. Full note to follow. History of malpositioned cholecystostomy tube which was only in the colon. This has fallen out or has been removed now for several months. Had cellulitis that is now resolved but no drainage from the area. This may evolve into a colocutaneous fistula but also may not ever do so. I think she is safe for discharge for now. She may follow up with one of my partners if desired or at another clinic. As I'm departing in less than 3 weeks, it is not proper for me to plan surgery at this point.    Lebron Tanner MD  [phone] (c)  [phone] (p)     This note was created using voice recognition software. Undetected word substitutions or other errors may have occurred.

## 2017-11-18 NOTE — PROGRESS NOTES
VSS. IV removed. Belongings with pt and family in room. Discharge instructions, medications, and dressing change education done with patient and family. Pt ready to d/c once family arrives with clothing.

## 2017-11-18 NOTE — PLAN OF CARE
Problem: Patient Care Overview  Goal: Plan of Care/Patient Progress Review  Outcome: No Change  Patient admitted to the floor around 1040. Alert and oriented, up Ax1. Ambulated from cart to bathroom. Incontinent at times. Patient denies pain and nausea. Wears hearing aids. Dressing placed on wound.

## 2017-11-18 NOTE — PHARMACY-ADMISSION MEDICATION HISTORY
Admission medication history interview status for this patient is complete. See [facility] admission navigator for allergy information, prior to admission medications and immunization status.     Medication history interview source(s):Patient and Family  Medication history resources (including written lists, pill bottles, clinic record):pill bottles  Primary pharmacy:[facility] in [place]    Changes made to PTA medication list:  Added: pepto, tylenol, preservision  Deleted: nitrofurantoin  Changed: Imdur from 60 mg to 30 mg     Actions taken by pharmacist (provider contacted, etc):None     Additional medication history information:None    Medication reconciliation/reorder completed by provider prior to medication history? Yes    Do you take OTC medications (eg tylenol, ibuprofen, fish oil, eye/ear drops, etc)? Y(Y/N)    For patients on insulin therapy: N (Y/N)    Time spent in this activity: 20 min    Prior to Admission medications    Medication Sig Last Dose Taking? Auth Provider   isosorbide mononitrate (IMDUR) 30 MG 24 hr tablet Take 30 mg by mouth daily 11/17/2017 at am Yes Unknown, Entered By History   Multiple Vitamins-Minerals (PRESERVISION AREDS PO) Take 1 tablet by mouth daily 11/17/2017 at am Yes Unknown, Entered By History   Acetaminophen (TYLENOL PO) Take 325 mg by mouth every 8 hours as needed for mild pain or fever  at prn Yes Unknown, Entered By History   bismuth subsalicylate (PEPTO BISMOL) 262 MG chewable tablet Take 524 mg by mouth daily as needed  at prn Yes Unknown, Entered By History   calcium-vitamin D (CALTRATE) 600-400 MG-UNIT per tablet Take 1 tablet by mouth daily  11/17/2017 at am Yes Reported, Patient   Multiple Vitamins-Minerals (CENTRUM SILVER) per tablet Take 1 tablet by mouth daily 11/17/2017 at am Yes Reported, Patient   Nitroglycerin (NITROTAB SL) Place 0.4 mg under the tongue every 5 minutes as needed   Yes Reported, Patient   aspirin 81 MG tablet Take 81 mg by mouth daily 11/17/2017 at am  Yes Unknown, Entered By History   atorvastatin (LIPITOR) 40 MG tablet Take 40 mg by mouth every evening  11/16/2017 at pm Yes Unknown, Entered By History   levothyroxine (SYNTHROID/LEVOTHROID) 75 MCG tablet Take 75 mcg by mouth daily 11/17/2017 at am Yes Unknown, Entered By History   metoprolol (LOPRESSOR) 25 MG tablet Take 25 mg by mouth 2 times daily 11/17/2017 at am Yes Unknown, Entered By History   pantoprazole (PROTONIX) 20 MG EC tablet Take 20 mg by mouth daily 11/16/2017 at pm Yes Unknown, Entered By History

## 2017-11-18 NOTE — ED NOTES
[facility]  ED Nurse Handoff Report    Pamela Garcia is a 89 year old female   ED Chief complaint: Abdominal Pain  . ED Diagnosis:   Final diagnoses:   Cellulitis of abdominal wall - possible fistula vs. deeper space infection     Allergies: No Known Allergies    Code Status: Not assessed.  Activity level - Baseline/Home:  Stand with Assist. Activity Level - Current:   Stand with Assist. Lift room needed: No. Bariatric: No   Needed: No   Isolation: No. Infection: Not Applicable.     Vital Signs:   Vitals:    11/17/17 1548 11/17/17 1800 11/17/17 1830   BP: 144/86 163/72 172/78   BP Location:  Right arm Right arm   Pulse: 71     Resp: 16 16 16   Temp: 97.9  F (36.6  C)     TempSrc: Temporal     SpO2: 97% 97% 96%       Cardiac Rhythm:  ,      Pain level: 0-10 Pain Scale: 0  Patient confused: No. Patient Falls Risk: Yes.   Elimination Status: Has voided   Patient Report - Initial Complaint: Wound to abdomen. Focused Assessment: Wound outlined by ED MD, new bandage placed.   Tests Performed: blood, CT. Abnormal Results:   Labs Ordered and Resulted from Time of ED Arrival Up to the Time of Departure from the ED   CBC WITH PLATELETS DIFFERENTIAL - Abnormal; Notable for the following:        Result Value    Hemoglobin 10.9 (*)     MCH 25.2 (*)     MCHC 30.9 (*)     RDW 17.7 (*)     All other components within normal limits   COMPREHENSIVE METABOLIC PANEL - Abnormal; Notable for the following:     Glucose 117 (*)     Albumin 2.9 (*)     All other components within normal limits   LIPASE - Abnormal; Notable for the following:     Lipase 43 (*)     All other components within normal limits   CREATININE POCT   ISTAT CREATININE NURSING POCT   FREE WATER   IV ACCESS     .   Treatments provided: IV antibiotic.  Family Comments: Pleasant at bedside.  OBS brochure/video discussed/provided to patient:  N/A  ED Medications:   Medications   0.9% sodium chloride BOLUS (0 mLs Intravenous Stopped 11/17/17 1949)    iopamidol (ISOVUE-370) solution 500 mL (72 mLs Intravenous Given 11/17/17 1947)   piperacillin-tazobactam (ZOSYN) infusion 3.375 g (0 g Intravenous Stopped 11/17/17 2150)     Drips infusing:  No  For the majority of the shift, the patient's behavior Green. Interventions performed were N/A.     Severe Sepsis OR Septic Shock Diagnosis Present: No      ED Nurse Name/Phone Number: Ginna Brannon,   9:54 PM    RECEIVING UNIT ED HANDOFF REVIEW    Above ED Nurse Handoff Report was reviewed: Yes  Reviewed by: Yanelis Castro on November 17, 2017 at 10:16 PM

## 2017-11-18 NOTE — CONSULTS
[facility]     Hospital Consultation     Pamela Garcia MRN# [id number]   YOB: 1925 Age: 89 year old     Primary care provider: [facility]    ASSESSMENT:   Pamela Garcia is an 89 year old year old female who I was asked to see by Dr. Tillman for fistula.    History of cholecystostomy tube with tract involving the colon that has now been removed. Redness and pain at site of catheter with internal inflammation on CT. No felix fistula and no fecal drainage. Unclear if the colon is also fistulized to the gallbladder.    RECOMMENDATIONS:   I discussed this with the patient. At this point there is no clinical fistula but this is certainly a possibility. It is likely that the inflammatory changes around this area and of the skin will progress and that this will become a draining colocutaneous fistula. It is also possible that this could never form and could even heal and resolve itself especially if the patient improves her nutrition and protein levels. If the fistula does form, it is also possible that it would close spontaneously. I do not recommend surgery at this time. Unfortunately, given my departure in 2-1/2 weeks, it is improper for me to follow this patient for elective surgery. She may follow up with any of my partners who have an interest in colon surgery or could follow-up with another clinic of her choice. Agree with discharge on antibiotics for the resolving cellulitis and continuing to monitor the area.    Lebron Tanner MD  [phone] (o)  Click here to send text page.     This note was created using voice recognition software. Undetected word substitutions or other errors may have occurred.      HPI:    Pamela Garcia is a 89 year old female who I was asked to see for fistula. The patient has history of a cholecystostomy tube which was placed when she was too ill to have gallbladder surgery. I reviewed the images from the initial placement of this by the  interventional radiologist and it was appropriately positioned in the gallbladder. Subsequent to that the tube evidently fell out and the patient stated to me that she was able to reinserted through the skin herself. There was a tube check done at the hospital which showed that there was no connection to the gallbladder but the tract itself communicated with the colon. It is not clear how this happened as to whether this was a technical misadventure when it was first placed or whether the patient herself perforated her colon when she attempted to reinserted. The tube was not reinserted at that time by the radiologist and the tract was left to heal. The patient states that it did do so and that there has been no drainage from it. However, recently she developed pain and redness with prominence of the site and came to the hospital. The emergency room doctor who evaluated the patient documented a 20 x 10 cm area of cellulitis. When I met the patient she continues to state that there has been no drainage in that she feels well and wants to leave the hospital. The cellulitis has almost completely regressed at the time of my examination.          PAST MEDICAL HISTORY:     Past Medical History:   Diagnosis Date     Arthritis      GI bleed      H/O heart artery stent      Thyroid disease         PAST SURGICAL HISTORY:     Past Surgical History:   Procedure Laterality Date     CARDIAC SURGERY      stents     GYN SURGERY         SOCIAL HISTORY:     Social History     Social History     Marital status:      Spouse name: N/A     Number of children: N/A     Years of education: N/A     Social History Main Topics     Smoking status: Never Smoker     Smokeless tobacco: Never Used     Alcohol use Not on file     Drug use: Not on file     Sexual activity: Not on file     Other Topics Concern     Not on file     Social History Narrative        FAMILY HISTORY:   No family history on file.    MEDICATIONS:     Current Outpatient  "Prescriptions   Medication Sig Dispense Refill     amoxicillin-clavulanate (AUGMENTIN) 500-125 MG per tablet Take 1 tablet by mouth 2 times daily for 7 days 14 tablet 0     Probiotic Product (4X PROBIOTIC) TABS Take 1 tablet by mouth 4 times daily for 10 days 40 tablet 0        ALLERGIES:   No Known Allergies     REVIEW OF SYSTEMS:   10 point ROS neg other than the symptoms noted above in the HPI or here.      PHYSICAL EXAM:   /67 (BP Location: Left arm)  Pulse 70  Temp 97.8  F (36.6  C) (Oral)  Resp 24  Ht 1.448 m (4' 9\")  Wt 66.4 kg (146 lb 6.4 oz)  SpO2 94%  BMI 31.68 kg/m2 Estimated body mass index is 31.68 kg/(m^2) as calculated from the following:    Height as of this encounter: 1.448 m (4' 9\").    Weight as of this encounter: 66.4 kg (146 lb 6.4 oz).   Constitutional: No acute distress. Elderly appearing.  Eyes: Sclerae anicteric, moist conjunctivae; no lid-lag; EOMI  ENT: Atraumatic; oropharynx clear with moist mucous membranes and no mucosal ulcerations; normal hard and soft palate  Neck: Supple neck without lymphadenopathy, no thyromegaly  Respiratory: Clear to auscultation bilaterally with normal respiratory effort  Cardiovascular: Regular rate and rhythm, no MRGs  GI: Soft, nontender, nondistended; no masses or HSM  Lymph/Hematologic: No pretibial edema  Genitourinary: Deferred  Skin: Normal temperature, turgor and texture; no rash, ulcers or subcutaneous nodules. There is a small reddened area in the right upper quadrant corresponding to the drain site for the skin appears attenuated but there is no cellulitis and I do not see any drainage.  Musculoskeletal: Grossly symmetric and normal muscle bulk for age in all extremities; gait and station not examined; no clubbing or cyanosis  Neurologic: CN II-XII grossly intact without deficits; sensation intact to light touch over all extremities  Neuropsychiatric: Appropriate affect, alert and oriented to person, place and time     LABORATORY AND " IMAGING:      Results for orders placed or performed during the hospital encounter of 11/17/17   CT Abdomen Pelvis w Contrast    Narrative    CT ABDOMEN AND PELVIS WITH CONTRAST 11/17/2017 7:56 PM     HISTORY: Abdominal pain, swelling.    COMPARISON: 9/15/2017    TECHNIQUE: Volumetric helical acquisition of CT images from the lung  bases through the symphysis pubis after the administration of 72mL  Isovue-370 intravenous contrast. Radiation dose for this scan was  reduced using automated exposure control, adjustment of the mA and/or  kV according to patient size, or iterative reconstruction technique.    FINDINGS: Stranding persists in the region of the previous  cholecystostomy tube. Cholelithiasis without definite cholecystitis  noted. No free air or free fluid. Normal appendix. The liver, spleen,  adrenal glands, kidneys, and pancreas demonstrate no worrisome focal  lesion. Fatty atrophy of the pancreas noted. There are extensive  atherosclerotic changes of the visualized aorta and its branches.  There is no evidence of aortic dissection or aneurysm. There are no  dilated loops of small intestine or large bowel to suggest ileus or  obstruction. There is moderate diverticulosis without evidence for  diverticulitis. No adenopathy. No intra-abdominal abscess. Stable T12  compression deformity. No frankly destructive bony lesions. Mild  dilatation of the distal thoracic aorta at 3.2 cm.      Impression    IMPRESSION:  1. Stranding in the region the previous cholecystostomy tube tract is  similar to previous. No acute process demonstrated in the abdomen and  pelvis.  2. Unchanged cholelithiasis without cholecystitis.  3. 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    MARY ANN POWELL MD   CBC with platelets differential   Result Value Ref Range    WBC 10.9 4.0 - 11.0 10e9/L    RBC Count 4.32 3.8 - 5.2 10e12/L    Hemoglobin 10.9 (L) 11.7 - 15.7 g/dL    Hematocrit 35.3 35.0 - 47.0 %    MCV 82 78 - 100 fl    MCH  25.2 (L) 26.5 - 33.0 pg    MCHC 30.9 (L) 31.5 - 36.5 g/dL    RDW 17.7 (H) 10.0 - 15.0 %    Platelet Count 297 150 - 450 10e9/L    Diff Method Automated Method     % Neutrophils 64.5 %    % Lymphocytes 21.2 %    % Monocytes 12.0 %    % Eosinophils 1.4 %    % Basophils 0.5 %    % Immature Granulocytes 0.4 %    Nucleated RBCs 0 0 /100    Absolute Neutrophil 7.0 1.6 - 8.3 10e9/L    Absolute Lymphocytes 2.3 0.8 - 5.3 10e9/L    Absolute Monocytes 1.3 0.0 - 1.3 10e9/L    Absolute Eosinophils 0.2 0.0 - 0.7 10e9/L    Absolute Basophils 0.1 0.0 - 0.2 10e9/L    Abs Immature Granulocytes 0.0 0 - 0.4 10e9/L    Absolute Nucleated RBC 0.0    Comprehensive metabolic panel   Result Value Ref Range    Sodium 138 133 - 144 mmol/L    Potassium 4.1 3.4 - 5.3 mmol/L    Chloride 102 94 - 109 mmol/L    Carbon Dioxide 31 20 - 32 mmol/L    Anion Gap 5 3 - 14 mmol/L    Glucose 117 (H) 70 - 99 mg/dL    Urea Nitrogen 20 7 - 30 mg/dL    Creatinine 0.75 0.52 - 1.04 mg/dL    GFR Estimate 72 >60 mL/min/1.7m2    GFR Estimate If Black 87 >60 mL/min/1.7m2    Calcium 8.6 8.5 - 10.1 mg/dL    Bilirubin Total 0.4 0.2 - 1.3 mg/dL    Albumin 2.9 (L) 3.4 - 5.0 g/dL    Protein Total 7.5 6.8 - 8.8 g/dL    Alkaline Phosphatase 127 40 - 150 U/L    ALT 21 0 - 50 U/L    AST 19 0 - 45 U/L   Lipase   Result Value Ref Range    Lipase 43 (L) 73 - 393 U/L   Creatinine POCT   Result Value Ref Range    Creatinine 0.7 0.52 - 1.04 mg/dL    GFR Estimate 78 >60 mL/min/1.7m2    GFR Estimate If Black >90 >60 mL/min/1.7m2   Basic metabolic panel   Result Value Ref Range    Sodium 137 133 - 144 mmol/L    Potassium 3.9 3.4 - 5.3 mmol/L    Chloride 101 94 - 109 mmol/L    Carbon Dioxide 30 20 - 32 mmol/L    Anion Gap 6 3 - 14 mmol/L    Glucose 119 (H) 70 - 99 mg/dL    Urea Nitrogen 18 7 - 30 mg/dL    Creatinine 0.81 0.52 - 1.04 mg/dL    GFR Estimate 66 >60 mL/min/1.7m2    GFR Estimate If Black 80 >60 mL/min/1.7m2    Calcium 8.3 (L) 8.5 - 10.1 mg/dL   CBC with platelets differential    Result Value Ref Range    WBC 7.5 4.0 - 11.0 10e9/L    RBC Count 4.17 3.8 - 5.2 10e12/L    Hemoglobin 10.7 (L) 11.7 - 15.7 g/dL    Hematocrit 34.0 (L) 35.0 - 47.0 %    MCV 82 78 - 100 fl    MCH 25.7 (L) 26.5 - 33.0 pg    MCHC 31.5 31.5 - 36.5 g/dL    RDW 18.0 (H) 10.0 - 15.0 %    Platelet Count 280 150 - 450 10e9/L    Diff Method Automated Method     % Neutrophils 61.6 %    % Lymphocytes 22.6 %    % Monocytes 12.3 %    % Eosinophils 2.7 %    % Basophils 0.4 %    % Immature Granulocytes 0.4 %    Nucleated RBCs 0 0 /100    Absolute Neutrophil 4.6 1.6 - 8.3 10e9/L    Absolute Lymphocytes 1.7 0.8 - 5.3 10e9/L    Absolute Monocytes 0.9 0.0 - 1.3 10e9/L    Absolute Eosinophils 0.2 0.0 - 0.7 10e9/L    Absolute Basophils 0.0 0.0 - 0.2 10e9/L    Abs Immature Granulocytes 0.0 0 - 0.4 10e9/L    Absolute Nucleated RBC 0.0      I personally reviewed and interpreted the CT scan. Inflammation in the right upper quadrant but no clear communication with gallbladder and no air or liquid extending along the tract to the skin.    30 minutes were spent in this encounter, over 50% in counseling and coordination of care.

## 2017-11-20 NOTE — TELEPHONE ENCOUNTER
GENERAL SURGERY NURSE PHONE TRIAGE   Pamela Garcia    MRN# [id number]  AGE:  89 year old  YOB: 1925  [phone] (home)      Surgeon: Dr. Tanner   Patient was seen by Dr. 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  States mom has had 3 loose stools a day, she is wondering if she can change  Antibiotics.    Review of chart- unsure of what surgeon / group patient and family will choose to  Follow with.  Patient lives in [facility], may choose someone closer to home.      PLAN:   Will call PCP re loose stools, and possible change in antibiotic.  Will call to schedule appointment with surgeon, if they decide to stay in [facility] for surgery.  Telma Cortes RN

## 2018-01-29 NOTE — PROGRESS NOTES
Gallbladder tube dressing change completed without difficulty, including all tubing and drainage bag. Site is clean and dry without redness. Patient discharged to home via wheelchair in stable condition with daughter.  
No

## 2019-03-12 NOTE — PROGRESS NOTES
01/26/17 1400   Quick Adds   Type of Visit Initial PT Evaluation   Living Environment   Lives With alone   Living Arrangements condominium   Transportation Available car   Living Environment Comment Patient typically resides in condo alone in [place]. Is staying with her dsaughter for the month of January. Daughter lives in a split entry home- 1 step in from garage and patient thinks 10 steps up to upper [place]. Typically, she stays on upper level when daughter is at work. Patient reports her other daughter could come stay with her when when daughter she is staying with is at work.   Self-Care   Usual Activity Tolerance fair   Current Activity Tolerance fair   Regular Exercise no   Equipment Currently Used at Home cane, straight;walker, rolling  (4WW)   Activity/Exercise/Self-Care Comment pt states she uses 4WW at baseline, when out with daughters will hold their arm to walk   Functional Level Prior   Ambulation 1-->assistive equipment   Transferring 0-->independent   Toileting 0-->independent   Bathing 1-->assistive equipment   Dressing 0-->independent   Eating 0-->independent   Communication 0-->understands/communicates without difficulty   Swallowing 0-->swallows foods/liquids without difficulty   Cognition 0 - no cognition issues reported   Fall history within last six months yes   Number of times patient has fallen within last six months 1   Prior Functional Level Comment Pt had one fall prior to admit where her daughters found her on the floor next to her bed   General Information   Onset of Illness/Injury or Date of Surgery - Date 01/24/17   Referring Physician LAUREN Ames   Patient/Family Goals Statement return to daughter's home   Pertinent History of Current Problem (include personal factors and/or comorbidities that impact the POC) Pamela Garcia is a 89-year-old female with a past medical history significant for coronary artery disease, most recently status post 5-stent placement at [facility] in  11/2016.  She is currently on dual antiplatelet therapy.  She presents to the emergency room with acute onset of generalized weakness without any preceding or constitutional symptoms.  pt found to have sepsis due to cholecystis, drain placed and will be left in for 6 weeks   Precautions/Limitations fall precautions   General Observations pt is very [place], up in chair upon arrival, agreeable to PT.  Pt's daughter present for session and assisting with history.     Cognitive Status Examination   Orientation orientation to person, place and time   Level of Consciousness alert   Follows Commands and Answers Questions 75% of the time   Personal Safety and Judgment intact   Memory intact   Cognitive Comment Pt needs repetition due to [place]   Pain Assessment   Patient Currently in Pain No   Integumentary/Edema   Integumentary/Edema Comments pt with drain placed for cholecystis   Posture    Posture Forward head position   Range of Motion (ROM)   ROM Comment LE ROM WFL   Strength   Strength Comments pt able to demonstrate anti gravity LE strength   Bed Mobility   Bed Mobility Comments not tested   Transfer Skills   Transfer Comments SBA   Gait   Gait Comments pt walked 100 feet with WW and CGA/SBA, cues for standing closer to walker, upright posture   Balance   Balance Comments UE support of walker   Coordination   Coordination no deficits were identified   Muscle Tone   Muscle Tone no deficits were identified   General Therapy Interventions   Planned Therapy Interventions bed mobility training;gait training;strengthening;transfer training   Clinical Impression   Criteria for Skilled Therapeutic Intervention yes, treatment indicated   PT Diagnosis impaired mobility   Influenced by the following impairments weakness, fatigue   Functional limitations due to impairments impaired gait, decreased activity tolerance   Clinical Presentation Stable/Uncomplicated   Clinical Presentation Rationale Pt is stable condition following sepsis  "and drain placmeent for cholecystis.  Decreased activity tolerance   Clinical Decision Making (Complexity) Low complexity   Therapy Frequency` 5 times/week   Predicted Duration of Therapy Intervention (days/wks) 4 days   Anticipated Discharge Disposition Home with Assist;Home with Home Therapy   Risk & Benefits of therapy have been explained Yes   Patient, Family & other staff in agreement with plan of care Yes   Health system-[facility] TM \"6 Clicks\"   2016, Trustees of [facility], under license to [employer].  All rights reserved.   6 Clicks Short Forms Basic Mobility Inpatient Short Form   [facility] AM-PAC  \"6 Clicks\" V.2 Basic Mobility Inpatient Short Form   1. Turning from your back to your side while in a flat bed without using bedrails? 3 - A Little   2. Moving from lying on your back to sitting on the side of a flat bed without using bedrails? 3 - A Little   3. Moving to and from a bed to a chair (including a wheelchair)? 3 - A Little   4. Standing up from a chair using your arms (e.g., wheelchair, or bedside chair)? 3 - A Little   5. To walk in hospital room? 3 - A Little   6. Climbing 3-5 steps with a railing? 3 - A Little   Basic Mobility Raw Score (Score out of 24.Lower scores equate to lower levels of function) 18   Total Evaluation Time   Total Evaluation Time (Minutes) 8     " none

## 2021-07-07 NOTE — PROVIDER NOTIFICATION
Dr. Mckeon instructed this writer to give the ASA 81mg and Plavix 75mg  
This writer spoke with Chris from the [facility] microbiology. Pt BC from the 24th drawn left arm growing gram negative rods. Dr. Singh updated via web page system.   
This writer spoke with Jairo from Microbiology at the [facility]. Pt has e-coli growing from BC drawn the 24th from left arm. This information was given to Dr. Singh per web page system.   
Detail Level: Simple